# Patient Record
Sex: MALE | Race: WHITE | NOT HISPANIC OR LATINO | Employment: OTHER | URBAN - METROPOLITAN AREA
[De-identification: names, ages, dates, MRNs, and addresses within clinical notes are randomized per-mention and may not be internally consistent; named-entity substitution may affect disease eponyms.]

---

## 2018-10-15 ENCOUNTER — HOSPITAL ENCOUNTER (OUTPATIENT)
Dept: OTHER | Facility: HOSPITAL | Age: 73
Discharge: HOME | End: 2018-10-15
Attending: FAMILY MEDICINE | Admitting: FAMILY MEDICINE

## 2018-11-30 ENCOUNTER — HOSPITAL ENCOUNTER (OUTPATIENT)
Dept: OTHER | Facility: HOSPITAL | Age: 73
Discharge: HOME | End: 2018-11-30
Attending: INTERNAL MEDICINE | Admitting: INTERNAL MEDICINE

## 2018-12-04 ENCOUNTER — HOSPITAL ENCOUNTER (OUTPATIENT)
Dept: OTHER | Facility: HOSPITAL | Age: 73
Discharge: HOME | End: 2018-12-04
Attending: INTERNAL MEDICINE | Admitting: INTERNAL MEDICINE

## 2019-03-22 ENCOUNTER — HOSPITAL ENCOUNTER (OUTPATIENT)
Dept: OTHER | Facility: HOSPITAL | Age: 74
Discharge: HOME | End: 2019-03-22
Attending: SURGERY | Admitting: SURGERY

## 2019-03-29 ENCOUNTER — HOSPITAL ENCOUNTER (OUTPATIENT)
Dept: WOUND CARE | Facility: HOSPITAL | Age: 74
Discharge: HOME | End: 2019-03-29
Attending: INTERNAL MEDICINE | Admitting: INTERNAL MEDICINE

## 2019-04-05 ENCOUNTER — HOSPITAL ENCOUNTER (OUTPATIENT)
Dept: WOUND CARE | Facility: HOSPITAL | Age: 74
Discharge: HOME | End: 2019-04-05
Attending: SURGERY | Admitting: SURGERY

## 2019-04-12 ENCOUNTER — HOSPITAL ENCOUNTER (OUTPATIENT)
Dept: WOUND CARE | Facility: HOSPITAL | Age: 74
Discharge: HOME | End: 2019-04-12
Attending: SURGERY | Admitting: SURGERY

## 2019-04-19 ENCOUNTER — HOSPITAL ENCOUNTER (OUTPATIENT)
Dept: WOUND CARE | Facility: HOSPITAL | Age: 74
Discharge: HOME | End: 2019-04-19
Attending: SURGERY | Admitting: SURGERY

## 2019-04-26 ENCOUNTER — HOSPITAL ENCOUNTER (OUTPATIENT)
Dept: WOUND CARE | Facility: HOSPITAL | Age: 74
Discharge: HOME | End: 2019-04-26
Attending: SURGERY | Admitting: SURGERY

## 2019-05-02 ENCOUNTER — HOSPITAL ENCOUNTER (OUTPATIENT)
Dept: WOUND CARE | Facility: HOSPITAL | Age: 74
Discharge: HOME | End: 2019-05-02
Attending: SURGERY | Admitting: SURGERY

## 2019-05-10 ENCOUNTER — HOSPITAL ENCOUNTER (OUTPATIENT)
Dept: WOUND CARE | Facility: HOSPITAL | Age: 74
Discharge: HOME | End: 2019-05-10
Attending: SURGERY | Admitting: SURGERY

## 2019-06-27 DIAGNOSIS — I10 HYPERTENSION, UNSPECIFIED TYPE: Primary | ICD-10-CM

## 2019-06-27 NOTE — TELEPHONE ENCOUNTER
PATIENT NEEDS REFILL OF  IRBESARTAN 150MG    ONE PER DAY--CALL TO ADVISE PATIENT--[facility]

## 2019-06-28 DIAGNOSIS — Z12.5 PROSTATE CANCER SCREENING: ICD-10-CM

## 2019-06-28 DIAGNOSIS — E78.5 HYPERLIPIDEMIA, UNSPECIFIED HYPERLIPIDEMIA TYPE: ICD-10-CM

## 2019-06-28 DIAGNOSIS — I10 HYPERTENSION, UNSPECIFIED TYPE: Primary | ICD-10-CM

## 2019-06-28 RX ORDER — IRBESARTAN 150 MG/1
150 TABLET ORAL DAILY
Qty: 30 TABLET | Refills: 5 | Status: SHIPPED | OUTPATIENT
Start: 2019-06-28 | End: 2019-07-08 | Stop reason: SDUPTHER

## 2019-06-28 RX ORDER — IRBESARTAN 150 MG/1
TABLET ORAL
Refills: 5 | COMMUNITY
Start: 2019-04-26 | End: 2019-06-28 | Stop reason: SDUPTHER

## 2019-06-29 ENCOUNTER — RESULTS ENCOUNTER (OUTPATIENT)
Dept: FAMILY MEDICINE CLINIC | Facility: CLINIC | Age: 74
End: 2019-06-29

## 2019-06-29 DIAGNOSIS — I10 HYPERTENSION, UNSPECIFIED TYPE: ICD-10-CM

## 2019-06-29 DIAGNOSIS — Z12.5 PROSTATE CANCER SCREENING: ICD-10-CM

## 2019-06-29 DIAGNOSIS — E78.5 HYPERLIPIDEMIA, UNSPECIFIED HYPERLIPIDEMIA TYPE: ICD-10-CM

## 2019-07-03 ENCOUNTER — RESULTS ENCOUNTER (OUTPATIENT)
Dept: FAMILY MEDICINE CLINIC | Facility: CLINIC | Age: 74
End: 2019-07-03

## 2019-07-03 DIAGNOSIS — E78.5 HYPERLIPIDEMIA, UNSPECIFIED HYPERLIPIDEMIA TYPE: ICD-10-CM

## 2019-07-08 ENCOUNTER — OFFICE VISIT (OUTPATIENT)
Dept: FAMILY MEDICINE CLINIC | Facility: CLINIC | Age: 74
End: 2019-07-08

## 2019-07-08 VITALS
BODY MASS INDEX: 30.18 KG/M2 | TEMPERATURE: 98.4 F | RESPIRATION RATE: 19 BRPM | OXYGEN SATURATION: 98 % | DIASTOLIC BLOOD PRESSURE: 74 MMHG | SYSTOLIC BLOOD PRESSURE: 138 MMHG | HEIGHT: 70 IN | WEIGHT: 210.8 LBS | HEART RATE: 66 BPM

## 2019-07-08 DIAGNOSIS — E78.2 MIXED HYPERLIPIDEMIA: Chronic | ICD-10-CM

## 2019-07-08 DIAGNOSIS — Z12.11 COLON CANCER SCREENING: ICD-10-CM

## 2019-07-08 DIAGNOSIS — R94.39 ABNORMAL STRESS TEST: ICD-10-CM

## 2019-07-08 DIAGNOSIS — I10 HYPERTENSION, UNSPECIFIED TYPE: ICD-10-CM

## 2019-07-08 DIAGNOSIS — Z92.89 HISTORY OF USE OF HEARING AID IN BOTH EARS: ICD-10-CM

## 2019-07-08 DIAGNOSIS — L03.115 CELLULITIS OF RIGHT LOWER EXTREMITY: ICD-10-CM

## 2019-07-08 DIAGNOSIS — R79.89 ELEVATED LFTS: ICD-10-CM

## 2019-07-08 DIAGNOSIS — I25.119 CORONARY ARTERY DISEASE INVOLVING NATIVE HEART WITH ANGINA PECTORIS, UNSPECIFIED VESSEL OR LESION TYPE (HCC): ICD-10-CM

## 2019-07-08 DIAGNOSIS — S02.91XD CLOSED FRACTURE OF SKULL WITH ROUTINE HEALING, UNSPECIFIED BONE, SUBSEQUENT ENCOUNTER: ICD-10-CM

## 2019-07-08 DIAGNOSIS — L84 CALLUS OF FOOT: ICD-10-CM

## 2019-07-08 DIAGNOSIS — I10 HTN (HYPERTENSION), BENIGN: Chronic | ICD-10-CM

## 2019-07-08 DIAGNOSIS — R94.31 ABNORMAL EKG: ICD-10-CM

## 2019-07-08 DIAGNOSIS — Z00.00 MEDICARE ANNUAL WELLNESS VISIT, SUBSEQUENT: Primary | ICD-10-CM

## 2019-07-08 DIAGNOSIS — Z12.5 SCREENING FOR PROSTATE CANCER: ICD-10-CM

## 2019-07-08 DIAGNOSIS — L02.415 ABSCESS OF RIGHT LEG: ICD-10-CM

## 2019-07-08 PROBLEM — M51.9 LUMBAR DISC DISEASE: Status: ACTIVE | Noted: 2019-07-08

## 2019-07-08 PROBLEM — E66.3 OVERWEIGHT: Status: ACTIVE | Noted: 2019-07-08

## 2019-07-08 PROBLEM — Z00.01 ENCOUNTER FOR ROUTINE ADULT MEDICAL EXAM WITH ABNORMAL FINDINGS: Status: ACTIVE | Noted: 2019-07-08

## 2019-07-08 PROBLEM — I25.10 CAD (CORONARY ARTERY DISEASE): Status: ACTIVE | Noted: 2019-07-08

## 2019-07-08 LAB
BILIRUB BLD-MCNC: NEGATIVE MG/DL
CLARITY, POC: CLEAR
COLOR UR: YELLOW
GLUCOSE UR STRIP-MCNC: NEGATIVE MG/DL
KETONES UR QL: NEGATIVE
LEUKOCYTE EST, POC: NEGATIVE
NITRITE UR-MCNC: NEGATIVE MG/ML
PH UR: 6 [PH] (ref 5–8)
PROT UR STRIP-MCNC: NEGATIVE MG/DL
RBC # UR STRIP: NEGATIVE /UL
SP GR UR: 1.01 (ref 1–1.03)
UROBILINOGEN UR QL: NORMAL

## 2019-07-08 PROCEDURE — 99213 OFFICE O/P EST LOW 20 MIN: CPT | Performed by: FAMILY MEDICINE

## 2019-07-08 PROCEDURE — G0439 PPPS, SUBSEQ VISIT: HCPCS | Performed by: FAMILY MEDICINE

## 2019-07-08 PROCEDURE — 81002 URINALYSIS NONAUTO W/O SCOPE: CPT | Performed by: FAMILY MEDICINE

## 2019-07-08 PROCEDURE — 99497 ADVNCD CARE PLAN 30 MIN: CPT | Performed by: FAMILY MEDICINE

## 2019-07-08 RX ORDER — ASPIRIN 81 MG/1
1 TABLET ORAL DAILY
COMMUNITY

## 2019-07-08 RX ORDER — FLUOROURACIL 50 MG/G
CREAM TOPICAL
Refills: 1 | COMMUNITY
Start: 2019-06-17 | End: 2021-01-22

## 2019-07-08 RX ORDER — CYCLOBENZAPRINE HCL 10 MG
.5-1 TABLET ORAL NIGHTLY PRN
COMMUNITY
Start: 2018-09-04 | End: 2021-01-22

## 2019-07-08 RX ORDER — IRBESARTAN 150 MG/1
150 TABLET ORAL DAILY
Qty: 90 TABLET | Refills: 3 | Status: SHIPPED | OUTPATIENT
Start: 2019-07-08 | End: 2020-09-18 | Stop reason: SDUPTHER

## 2019-07-08 RX ORDER — NAPROXEN SODIUM 220 MG
1 TABLET ORAL DAILY
COMMUNITY
End: 2021-07-20

## 2019-07-08 RX ORDER — MULTIVITAMIN/IRON/FOLIC ACID 18MG-0.4MG
1 TABLET ORAL DAILY
COMMUNITY

## 2019-07-08 NOTE — PROGRESS NOTES
Subjective   Anthony Shelley is a 74 y.o. male.     Chief Complaint   Patient presents with   • Medicare Wellness-subsequent     last med well was 2018 last stress test 10/15/2018   • Hyperlipidemia     last lipid 2019   • Hypertension       The patient is here: to discuss health maintenance and disease prevention.  Last comprehensive physical was on 2018.  Patient's previous physician was Dr.John Johnson.  Overall has: moderate activity with work/home activities, exercises 1 time per week, good appetite, good energy level and is sleeping well. Nutrition: appropriate balanced diet. Last tetanus shot was less than 5 years ago. Patient's last colonoscopy was: 2018.    Hyperlipidemia   This is a chronic problem. The current episode started more than 1 year ago. The problem is controlled. Recent lipid tests were reviewed and are normal. There are no known factors aggravating his hyperlipidemia. Pertinent negatives include no chest pain or shortness of breath.   Hypertension   This is a chronic problem. The current episode started more than 1 year ago. Pertinent negatives include no chest pain, palpitations or shortness of breath.        Recent Hospitalizations:  Recently treated at the following:  Other: [facility].  [facility]    I personally reviewed and updated the patient's allergies, medications, problem list, and past medical, surgical, social, and family history.     Family History   Problem Relation Age of Onset   • Stroke Father    • Cancer Brother         Lymph node       Social History     Tobacco Use   • Smoking status: Former Smoker     Last attempt to quit: 2000     Years since quittin.5   Substance Use Topics   • Alcohol use: Yes   • Drug use: No       Past Surgical History:   Procedure Laterality Date   • CARDIAC CATHETERIZATION  2018   • CHOLECYSTECTOMY         Patient Active Problem List   Diagnosis   • CAD (coronary artery disease)   • HTN  (hypertension), benign   • Mixed hyperlipidemia   • Lumbar disc disease   • Overweight   • Colon cancer screening   • Medicare annual wellness visit, subsequent   • Screening for prostate cancer   • Callus of foot   • Elevated LFTs   • Cellulitis of right lower extremity         Current Outpatient Medications:   •  aspirin 81 MG EC tablet, Take 1 tablet by mouth Daily., Disp: , Rfl:   •  irbesartan (AVAPRO) 150 MG tablet, Take 1 tablet by mouth Daily., Disp: 90 tablet, Rfl: 3  •  metoprolol tartrate (LOPRESSOR) 25 MG tablet, Take 1 tablet by mouth 2 (Two) Times a Day., Disp: 180 tablet, Rfl: 3  •  Multiple Vitamins-Minerals (CENTRUM ADULTS) tablet, Take 1 tablet by mouth Daily., Disp: , Rfl:   •  naproxen sodium (ALEVE) 220 MG tablet, Take 1 tablet by mouth Daily., Disp: , Rfl:   •  cyclobenzaprine (FLEXERIL) 10 MG tablet, Take 0.5-1 tablets by mouth At Night As Needed., Disp: , Rfl:   •  fluorouracil (EFUDEX) 5 % cream, APPLY A SUFFICIENT AMOUNT TO COVER THE LESIONS IN THE AFFECTED AREA(S) OF SCALP AND FACE TWICE DAILY FOR 2 WEEKS, Disp: , Rfl: 1  •  silver sulfadiazine (SILVADENE) 1 % cream, Apply 1 application topically to the appropriate area as directed 2 (Two) Times a Day., Disp: , Rfl:          Review of Systems   Constitutional: Negative for chills and diaphoresis.   HENT: Negative for trouble swallowing and voice change.    Eyes: Negative for visual disturbance.   Respiratory: Negative for shortness of breath.    Cardiovascular: Negative for chest pain and palpitations.   Gastrointestinal: Negative for abdominal pain and nausea.   Endocrine: Negative for polydipsia and polyphagia.   Genitourinary: Negative for hematuria.   Musculoskeletal: Negative for neck stiffness.   Skin: Negative for color change and pallor.   Allergic/Immunologic: Negative for immunocompromised state.   Neurological: Negative for seizures and syncope.   Hematological: Negative for adenopathy.   Psychiatric/Behavioral: Negative for  "sleep disturbance and suicidal ideas.       Objective   /74   Pulse 66   Temp 98.4 °F (36.9 °C)   Resp 19   Ht 178.4 cm (70.25\")   Wt 95.6 kg (210 lb 12.8 oz)   SpO2 98%   BMI 30.03 kg/m²    Wt Readings from Last 3 Encounters:   07/08/19 95.6 kg (210 lb 12.8 oz)   03/20/19 95.5 kg (210 lb 9.6 oz)   02/20/19 94.2 kg (207 lb 9.6 oz)         Physical Exam   Constitutional: He is oriented to person, place, and time. He appears well-developed and well-nourished.   HENT:   Head: Normocephalic.   Right Ear: Tympanic membrane, external ear and ear canal normal.   Left Ear: Tympanic membrane, external ear and ear canal normal.   Nose: Nose normal.   Eyes: Conjunctivae, EOM and lids are normal. Pupils are equal, round, and reactive to light.   Neck: No JVD present. Carotid bruit is not present. No tracheal deviation present. No thyromegaly present.   Cardiovascular: Normal rate, regular rhythm, normal heart sounds and intact distal pulses. Exam reveals no gallop and no friction rub.   No murmur heard.  Pulmonary/Chest: Effort normal and breath sounds normal. No stridor. He has no decreased breath sounds. He has no wheezes. He has no rales.   Abdominal: Soft. Bowel sounds are normal. He exhibits no distension and no mass. There is no tenderness. There is no rebound and no guarding. No hernia.   Lymphadenopathy:        Head (right side): No submental, no submandibular, no tonsillar, no preauricular, no posterior auricular and no occipital adenopathy present.        Head (left side): No submental, no submandibular, no tonsillar, no preauricular, no posterior auricular and no occipital adenopathy present.     He has no cervical adenopathy.   Neurological: He is alert and oriented to person, place, and time. He has normal strength and normal reflexes. No cranial nerve deficit or sensory deficit. Coordination and gait normal.   Skin: Skin is warm and dry. Turgor is normal. He is not diaphoretic. No pallor.       Recent " Lab Results:          Lab Results   Component Value Date    CHOL 232 (H) 04/16/2018    TRIG 156 (H) 04/16/2018    HDL 55 04/16/2018     LDL Cholesterol    Date Value Ref Range Status   04/16/2018 148 (H) NR Final     Lab Results   Component Value Date    PSA 1.5 04/16/2018     Lab Results   Component Value Date    WBC 5.9 04/16/2018    HGB 15.7 04/16/2018    HCT 45.1 04/16/2018    MCV 91.9 04/16/2018     04/16/2018     Lab Results   Component Value Date    TSH 7.03 (H) 04/16/2018     Lab Results   Component Value Date    BUN 15 01/07/2019    CREATININE 0.90 01/07/2019    EGFRIFNONA 84 01/07/2019    EGFRIFAFRI 98 01/07/2019    BCR NOT APPLICABLE 01/07/2019    K 4.6 01/07/2019    CO2 32 01/07/2019    CALCIUM 9.6 01/07/2019    ALBUMIN 4.3 01/07/2019    AST 40 (H) 01/07/2019    ALT 62 (H) 01/07/2019         Age-appropriate Screening Schedule:  Refer to the list below for future screening recommendations based on patient's age, sex and/or medical conditions. Orders for these recommended tests are listed in the plan section. The patient has been provided with a written plan.    Health Maintenance   Topic Date Due   • TDAP/TD VACCINES (1 - Tdap) 06/28/1964   • ZOSTER VACCINE (1 of 2) 06/28/1995   • PNEUMOCOCCAL VACCINES (65+ LOW/MEDIUM RISK) (1 of 2 - PCV13) 06/28/2010   • INFLUENZA VACCINE  08/01/2019   • LIPID PANEL  07/01/2020   • COLONOSCOPY  06/28/2028       Depression Screen:   PHQ-2/PHQ-9 Depression Screening 7/8/2019   Little interest or pleasure in doing things 0   Feeling down, depressed, or hopeless 0   Trouble falling or staying asleep, or sleeping too much 0   Feeling tired or having little energy 0   Poor appetite or overeating 0   Feeling bad about yourself - or that you are a failure or have let yourself or your family down 0   Trouble concentrating on things, such as reading the newspaper or watching television 0   Moving or speaking so slowly that other people could have noticed. Or the opposite -  being so fidgety or restless that you have been moving around a lot more than usual 0   Thoughts that you would be better off dead, or of hurting yourself in some way 0   Total Score 0   If you checked off any problems, how difficult have these problems made it for you to do your work, take care of things at home, or get along with other people? Not difficult at all       Health Habits and Functional and Cognitive Screening:  Functional & Cognitive Status 7/8/2019   Do you have difficulty preparing food and eating? No   Do you have difficulty bathing yourself, getting dressed or grooming yourself? No   Do you have difficulty using the toilet? No   Do you have difficulty moving around from place to place? No   Do you have trouble with steps or getting out of a bed or a chair? No   In the past year have you fallen or experienced a near fall? No   Current Diet Well Balanced Diet   Dental Exam Up to date   Eye Exam Up to date   Exercise (times per week) 1 times per week   Current Exercise Activities Include Walking   Do you need help using the phone?  No   Are you deaf or do you have serious difficulty hearing?  Yes   Do you need help with transportation? No   Do you need help shopping? No   Do you need help preparing meals?  No   Do you need help with housework?  No   Do you need help with laundry? No   Do you need help taking your medications? No   Do you need help managing money? No   Do you ever drive or ride in a car without wearing a seat belt? No   Have you felt unusual stress, anger or loneliness in the last month? No   Who do you live with? Spouse   If you need help, do you have trouble finding someone available to you? No   Have you been bothered in the last four weeks by sexual problems? No   Do you have difficulty concentrating, remembering or making decisions? No       Does the patient have evidence of cognitive impairment? No    Advance Care Planning:  Patient does not have an advance directive - not  interested in additional information     A face-to-face visit was completed today with patient.  Counseling explanation, and discussion of advanced directives was performed.   The last advanced care visit was performed in 2018.  In a near life ending situation, from which he is not expected to recover functionally, and he is not able to speak for him, he does not want life sustaining measures. We discussed feeding tubes, ventilators and cardiac support as well as dialysis.    This discussion was completed and 16 minutes.     Identification of Risk Factors:  Risk factors include: cardiovascular risk and alcohol use.    Compared to one year ago, the patient feels his physical health is better.  Compared to one year ago, the patient feels his mental health is better.    Patient Self-Management and Personalized Health Advice  The patient has been provided with information about: weight management and preventive services including:   · Alcohol Misuse Screening and Counseling  (15 minutes counseling time, Code )  · Annual Wellness Visit (AWV)  · Colorectal Cancer Screening, Colonoscopy  · Counseling to Prevent Tobacco Use (use of smartset and [email or web address]@ smartphrase for documentation)  · Depression Screening (15 minutes face to face, Code ).      Assessment/Plan   Problems Addressed this Visit        Cardiovascular and Mediastinum    CAD (coronary artery disease)    Relevant Medications    metoprolol tartrate (LOPRESSOR) 25 MG tablet    HTN (hypertension), benign    Relevant Medications    irbesartan (AVAPRO) 150 MG tablet    metoprolol tartrate (LOPRESSOR) 25 MG tablet    Mixed hyperlipidemia       Musculoskeletal and Integument    Callus of foot    Relevant Medications    silver sulfadiazine (SILVADENE) 1 % cream    fluorouracil (EFUDEX) 5 % cream    Other Relevant Orders    Ambulatory Referral to Podiatry (Completed)       Other    Colon cancer screening    Medicare annual wellness visit, subsequent - Primary     Relevant Orders    POCT urinalysis dipstick, manual (Completed)    Screening for prostate cancer    Elevated LFTs    Cellulitis of right lower extremity     Much improved / skin closed           Other Visit Diagnoses     Abnormal stress test        History of use of hearing aid in both ears        Closed fracture of skull with routine healing, unspecified bone, subsequent encounter        Abscess of right leg        Abnormal EKG        Hypertension, unspecified type        Relevant Medications    irbesartan (AVAPRO) 150 MG tablet    metoprolol tartrate (LOPRESSOR) 25 MG tablet            Expected course, medications, and adverse effects discussed.  Call or return if worsening or persistent symptoms.

## 2019-07-13 PROBLEM — L03.115 CELLULITIS OF RIGHT LOWER EXTREMITY: Status: ACTIVE | Noted: 2019-07-13

## 2019-07-13 PROBLEM — R79.89 ELEVATED LFTS: Status: ACTIVE | Noted: 2019-07-13

## 2020-01-08 ENCOUNTER — OFFICE VISIT (OUTPATIENT)
Dept: FAMILY MEDICINE CLINIC | Facility: CLINIC | Age: 75
End: 2020-01-08

## 2020-01-08 VITALS
RESPIRATION RATE: 16 BRPM | BODY MASS INDEX: 30.61 KG/M2 | HEIGHT: 70 IN | HEART RATE: 71 BPM | WEIGHT: 213.8 LBS | TEMPERATURE: 98 F | SYSTOLIC BLOOD PRESSURE: 144 MMHG | OXYGEN SATURATION: 96 % | DIASTOLIC BLOOD PRESSURE: 84 MMHG

## 2020-01-08 DIAGNOSIS — Z23 NEED FOR INFLUENZA VACCINATION: ICD-10-CM

## 2020-01-08 DIAGNOSIS — I25.119 CORONARY ARTERY DISEASE INVOLVING NATIVE HEART WITH ANGINA PECTORIS, UNSPECIFIED VESSEL OR LESION TYPE (HCC): ICD-10-CM

## 2020-01-08 DIAGNOSIS — I10 HTN (HYPERTENSION), BENIGN: Primary | ICD-10-CM

## 2020-01-08 DIAGNOSIS — Z87.891 HISTORY OF TOBACCO USE: ICD-10-CM

## 2020-01-08 DIAGNOSIS — E66.3 OVERWEIGHT: ICD-10-CM

## 2020-01-08 DIAGNOSIS — E78.2 MIXED HYPERLIPIDEMIA: ICD-10-CM

## 2020-01-08 DIAGNOSIS — Z23 NEED FOR VACCINATION: ICD-10-CM

## 2020-01-08 PROCEDURE — 90653 IIV ADJUVANT VACCINE IM: CPT | Performed by: FAMILY MEDICINE

## 2020-01-08 PROCEDURE — 99214 OFFICE O/P EST MOD 30 MIN: CPT | Performed by: FAMILY MEDICINE

## 2020-01-08 PROCEDURE — G0008 ADMIN INFLUENZA VIRUS VAC: HCPCS | Performed by: FAMILY MEDICINE

## 2020-01-08 PROCEDURE — 90732 PPSV23 VACC 2 YRS+ SUBQ/IM: CPT | Performed by: FAMILY MEDICINE

## 2020-01-08 PROCEDURE — G0009 ADMIN PNEUMOCOCCAL VACCINE: HCPCS | Performed by: FAMILY MEDICINE

## 2020-01-08 RX ORDER — METOPROLOL SUCCINATE 50 MG/1
50 TABLET, EXTENDED RELEASE ORAL DAILY
Qty: 30 TABLET | Refills: 12 | Status: SHIPPED | OUTPATIENT
Start: 2020-01-08 | End: 2021-01-22 | Stop reason: SDUPTHER

## 2020-01-08 NOTE — PROGRESS NOTES
Subjective   Anthony Shelley is a 74 y.o. male.     Chief Complaint   Patient presents with   • Hypertension   • Hyperlipidemia       Hypertension   This is a chronic problem. The current episode started more than 1 year ago. Pertinent negatives include no chest pain, palpitations or shortness of breath. Risk factors for coronary artery disease include male gender. Current antihypertension treatment includes beta blockers (irbesartan 300mg daily). The current treatment provides mild improvement. There are no compliance problems.    Hyperlipidemia   This is a chronic problem. The current episode started more than 1 year ago. The problem is controlled. Recent lipid tests were reviewed and are normal. There are no known factors aggravating his hyperlipidemia. Pertinent negatives include no chest pain or shortness of breath. Current antihyperlipidemic treatment includes diet change. There are no compliance problems.  Risk factors for coronary artery disease include hypertension and male sex.            I personally reviewed and updated the patient's allergies, medications, problem list, and past medical, surgical, social, and family history.     Family History   Problem Relation Age of Onset   • Stroke Father    • Cancer Brother         Lymph node       Social History     Tobacco Use   • Smoking status: Former Smoker     Last attempt to quit: 2000     Years since quittin.0   • Smokeless tobacco: Never Used   Substance Use Topics   • Alcohol use: Yes   • Drug use: No       Past Surgical History:   Procedure Laterality Date   • CARDIAC CATHETERIZATION  2018   • CHOLECYSTECTOMY  1996       Patient Active Problem List   Diagnosis   • CAD (coronary artery disease)   • HTN (hypertension), benign   • Mixed hyperlipidemia   • Lumbar disc disease   • Overweight   • Colon cancer screening   • Medicare annual wellness visit, subsequent   • Screening for prostate cancer   • Callus of foot   • Elevated LFTs   •  "Cellulitis of right lower extremity   • History of tobacco use         Current Outpatient Medications:   •  aspirin 81 MG EC tablet, Take 1 tablet by mouth Daily., Disp: , Rfl:   •  cyclobenzaprine (FLEXERIL) 10 MG tablet, Take 0.5-1 tablets by mouth At Night As Needed., Disp: , Rfl:   •  fluorouracil (EFUDEX) 5 % cream, APPLY A SUFFICIENT AMOUNT TO COVER THE LESIONS IN THE AFFECTED AREA(S) OF SCALP AND FACE TWICE DAILY FOR 2 WEEKS, Disp: , Rfl: 1  •  irbesartan (AVAPRO) 150 MG tablet, Take 1 tablet by mouth Daily. (Patient taking differently: Take 300 mg by mouth Daily.), Disp: 90 tablet, Rfl: 3  •  Multiple Vitamins-Minerals (CENTRUM ADULTS) tablet, Take 1 tablet by mouth Daily., Disp: , Rfl:   •  naproxen sodium (ALEVE) 220 MG tablet, Take 1 tablet by mouth Daily., Disp: , Rfl:   •  silver sulfadiazine (SILVADENE) 1 % cream, Apply 1 application topically to the appropriate area as directed 2 (Two) Times a Day., Disp: , Rfl:   •  metoprolol succinate XL (TOPROL-XL) 50 MG 24 hr tablet, Take 1 tablet by mouth Daily for 30 days., Disp: 30 tablet, Rfl: 12         Review of Systems   Constitutional: Negative for chills and diaphoresis.   Eyes: Negative for visual disturbance.   Respiratory: Negative for shortness of breath.    Cardiovascular: Negative for chest pain and palpitations.   Gastrointestinal: Negative for abdominal pain and nausea.   Endocrine: Negative for polydipsia and polyphagia.   Musculoskeletal: Negative for neck stiffness.   Skin: Negative for color change and pallor.   Neurological: Negative for seizures and syncope.   Hematological: Negative for adenopathy.       Objective   /84 (BP Location: Left arm, Patient Position: Sitting, Cuff Size: Adult)   Pulse 71   Temp 98 °F (36.7 °C) (Oral)   Resp 16   Ht 178.4 cm (70.25\")   Wt 97 kg (213 lb 12.8 oz)   SpO2 96%   BMI 30.46 kg/m²   Wt Readings from Last 3 Encounters:   01/08/20 97 kg (213 lb 12.8 oz)   07/08/19 95.6 kg (210 lb 12.8 oz) "   03/20/19 95.5 kg (210 lb 9.6 oz)     Physical Exam   Constitutional: He is oriented to person, place, and time. He appears well-developed and well-nourished.   Cardiovascular: Normal rate, regular rhythm, S1 normal, S2 normal, normal heart sounds, intact distal pulses and normal pulses. Exam reveals no gallop and no friction rub.   No murmur heard.  Pulmonary/Chest: Effort normal and breath sounds normal. No accessory muscle usage or stridor. He has no decreased breath sounds. He has no wheezes. He has no rhonchi. He has no rales.   Abdominal: Soft. Normal appearance, normal aorta and bowel sounds are normal. He exhibits no distension, no pulsatile midline mass and no mass. There is no hepatosplenomegaly. There is no tenderness. There is no rigidity, no rebound, no guarding, no CVA tenderness and negative Thomas's sign. No hernia.   Neurological: He is alert and oriented to person, place, and time. Coordination and gait normal.   Skin: Skin is warm and dry. Turgor is normal. He is not diaphoretic. No pallor.         Assessment/Plan       Hypertension.  Persistent elevation, metoprolol increased.  Followed by Dr. Odom, cardiology follow-up upcoming.  Follow-up here 6 months.  Call or return if worsening symptoms.  Hyperlipidemia.  Recheck fasting lipid panel off statin currently, consider start low-dose crestor considering history of coronary artery disease.  Elevated LFTs resolved.  Coronary artery disease.  History of complete obstruction LAD with collaterals.  Continue risk factor reduction.  Followed by cardiology Dr. Odom.  Cellulitis right lower extremity.  Complicating hematoma.  Improved/resolved, wound healed, status post course wound care.    Problem List Items Addressed This Visit        Cardiovascular and Mediastinum    CAD (coronary artery disease)    Relevant Medications    metoprolol succinate XL (TOPROL-XL) 50 MG 24 hr tablet    HTN (hypertension), benign - Primary    Relevant Medications     metoprolol succinate XL (TOPROL-XL) 50 MG 24 hr tablet    Mixed hyperlipidemia    Relevant Orders    Comprehensive Metabolic Panel    Lipid Panel With / Chol / HDL Ratio       Other    Overweight    History of tobacco use      Other Visit Diagnoses     Need for influenza vaccination        Relevant Orders    Fluad Tri 65yr+ (Completed)    Need for vaccination        Relevant Orders    Pneumococcal Polysaccharide Vaccine 23-Valent Greater Than or Equal To 1yo Subcutaneous / IM (Completed)              Expected course, medications, and adverse effects discussed.  Call or return if worsening or persistent symptoms.

## 2020-01-09 LAB
ALBUMIN SERPL-MCNC: 4.6 G/DL (ref 3.5–4.8)
ALBUMIN/GLOB SERPL: 1.9 {RATIO} (ref 1.2–2.2)
ALP SERPL-CCNC: 103 IU/L (ref 39–117)
ALT SERPL-CCNC: 29 IU/L (ref 0–44)
AST SERPL-CCNC: 25 IU/L (ref 0–40)
BILIRUB SERPL-MCNC: 1.2 MG/DL (ref 0–1.2)
BUN SERPL-MCNC: 8 MG/DL (ref 8–27)
BUN/CREAT SERPL: 8 (ref 10–24)
CALCIUM SERPL-MCNC: 9.3 MG/DL (ref 8.6–10.2)
CHLORIDE SERPL-SCNC: 99 MMOL/L (ref 96–106)
CHOLEST SERPL-MCNC: 207 MG/DL (ref 100–199)
CHOLEST/HDLC SERPL: 3.6 RATIO (ref 0–5)
CO2 SERPL-SCNC: 24 MMOL/L (ref 20–29)
CREAT SERPL-MCNC: 0.98 MG/DL (ref 0.76–1.27)
GLOBULIN SER CALC-MCNC: 2.4 G/DL (ref 1.5–4.5)
GLUCOSE SERPL-MCNC: 105 MG/DL (ref 65–99)
HDLC SERPL-MCNC: 58 MG/DL
LDLC SERPL CALC-MCNC: 118 MG/DL (ref 0–99)
POTASSIUM SERPL-SCNC: 4.6 MMOL/L (ref 3.5–5.2)
PROT SERPL-MCNC: 7 G/DL (ref 6–8.5)
SODIUM SERPL-SCNC: 138 MMOL/L (ref 134–144)
TRIGL SERPL-MCNC: 156 MG/DL (ref 0–149)
VLDLC SERPL CALC-MCNC: 31 MG/DL (ref 5–40)

## 2020-01-10 DIAGNOSIS — E78.2 MIXED HYPERLIPIDEMIA: Primary | ICD-10-CM

## 2020-01-10 RX ORDER — ROSUVASTATIN CALCIUM 5 MG/1
5 TABLET, COATED ORAL DAILY
Qty: 30 TABLET | Refills: 12 | Status: SHIPPED | OUTPATIENT
Start: 2020-01-10 | End: 2020-12-14 | Stop reason: SDUPTHER

## 2020-01-10 NOTE — TELEPHONE ENCOUNTER
----- Message from Jonathan Johnson MD sent at 1/10/2020  6:29 AM EST -----  Let him know his blood work overall looks good, his liver function test remains normal, his cholesterol is just mildly elevated his LDL is 118, target is less than 100, it is overall better, last time it was 148, want him to keep up the good work on diet and exercise, I do want to try another cholesterol medicine to help decrease his risk of having more blockages in his heart, this one is Crestor, it is a low dose and is less likely to elevate his liver function tests, if he is okay with that send a request for 5 mg daily, thanks

## 2020-02-20 ENCOUNTER — HOSPITAL ENCOUNTER (OUTPATIENT)
Dept: CARDIOLOGY | Facility: HOSPITAL | Age: 75
Discharge: HOME | End: 2020-02-20
Admitting: INTERNAL MEDICINE

## 2020-02-20 VITALS — WEIGHT: 213 LBS | HEIGHT: 70 IN | BODY MASS INDEX: 30.49 KG/M2

## 2020-02-20 DIAGNOSIS — I51.7 CARDIOMEGALY: ICD-10-CM

## 2020-02-20 LAB
BH CV ECHO MEAS - ACS: 1.8 CM
BH CV ECHO MEAS - AO MAX PG (FULL): 0.95 MMHG
BH CV ECHO MEAS - AO MAX PG: 4.6 MMHG
BH CV ECHO MEAS - AO MEAN PG (FULL): 0.12 MMHG
BH CV ECHO MEAS - AO MEAN PG: 2.2 MMHG
BH CV ECHO MEAS - AO ROOT AREA (BSA CORRECTED): 1.4
BH CV ECHO MEAS - AO ROOT AREA: 6.9 CM^2
BH CV ECHO MEAS - AO ROOT DIAM: 3 CM
BH CV ECHO MEAS - AO V2 MAX: 106.9 CM/SEC
BH CV ECHO MEAS - AO V2 MEAN: 69.2 CM/SEC
BH CV ECHO MEAS - AO V2 VTI: 21.1 CM
BH CV ECHO MEAS - AVA(I,A): 3.5 CM^2
BH CV ECHO MEAS - AVA(I,D): 3.5 CM^2
BH CV ECHO MEAS - AVA(V,A): 3.1 CM^2
BH CV ECHO MEAS - AVA(V,D): 3.1 CM^2
BH CV ECHO MEAS - BSA(HAYCOCK): 2.2 M^2
BH CV ECHO MEAS - BSA: 2.1 M^2
BH CV ECHO MEAS - BZI_BMI: 30.6 KILOGRAMS/M^2
BH CV ECHO MEAS - BZI_METRIC_HEIGHT: 177.8 CM
BH CV ECHO MEAS - BZI_METRIC_WEIGHT: 96.6 KG
BH CV ECHO MEAS - EDV(CUBED): 142.7 ML
BH CV ECHO MEAS - EDV(MOD-SP4): 89.1 ML
BH CV ECHO MEAS - EDV(TEICH): 131 ML
BH CV ECHO MEAS - EF(CUBED): 54.8 %
BH CV ECHO MEAS - EF(MOD-BP): 45 %
BH CV ECHO MEAS - EF(MOD-SP4): 43.7 %
BH CV ECHO MEAS - EF(TEICH): 46.3 %
BH CV ECHO MEAS - ESV(CUBED): 64.5 ML
BH CV ECHO MEAS - ESV(MOD-SP4): 50.2 ML
BH CV ECHO MEAS - ESV(TEICH): 70.4 ML
BH CV ECHO MEAS - FS: 23.3 %
BH CV ECHO MEAS - IVS/LVPW: 1
BH CV ECHO MEAS - IVSD: 1.1 CM
BH CV ECHO MEAS - LA DIMENSION: 4.1 CM
BH CV ECHO MEAS - LA/AO: 1.4
BH CV ECHO MEAS - LV DIASTOLIC VOL/BSA (35-75): 41.6 ML/M^2
BH CV ECHO MEAS - LV MASS(C)D: 222.1 GRAMS
BH CV ECHO MEAS - LV MASS(C)DI: 103.6 GRAMS/M^2
BH CV ECHO MEAS - LV MAX PG: 3.6 MMHG
BH CV ECHO MEAS - LV MEAN PG: 2.1 MMHG
BH CV ECHO MEAS - LV SYSTOLIC VOL/BSA (12-30): 23.4 ML/M^2
BH CV ECHO MEAS - LV V1 MAX: 95.1 CM/SEC
BH CV ECHO MEAS - LV V1 MEAN: 68.1 CM/SEC
BH CV ECHO MEAS - LV V1 VTI: 21.8 CM
BH CV ECHO MEAS - LVIDD: 5.2 CM
BH CV ECHO MEAS - LVIDS: 4 CM
BH CV ECHO MEAS - LVOT AREA: 3.4 CM^2
BH CV ECHO MEAS - LVOT DIAM: 2.1 CM
BH CV ECHO MEAS - LVPWD: 1.1 CM
BH CV ECHO MEAS - MR MAX PG: 85.9 MMHG
BH CV ECHO MEAS - MR MAX VEL: 463.3 CM/SEC
BH CV ECHO MEAS - MV A MAX VEL: 78.2 CM/SEC
BH CV ECHO MEAS - MV E MAX VEL: 65.8 CM/SEC
BH CV ECHO MEAS - MV E/A: 0.84
BH CV ECHO MEAS - MV MAX PG: 2.7 MMHG
BH CV ECHO MEAS - MV MEAN PG: 1.1 MMHG
BH CV ECHO MEAS - MV V2 MAX: 82.8 CM/SEC
BH CV ECHO MEAS - MV V2 MEAN: 47.9 CM/SEC
BH CV ECHO MEAS - MV V2 VTI: 24.5 CM
BH CV ECHO MEAS - MVA(VTI): 3.1 CM^2
BH CV ECHO MEAS - PA ACC TIME: 0.13 SEC
BH CV ECHO MEAS - PA MAX PG: 14.6 MMHG
BH CV ECHO MEAS - PA PR(ACCEL): 19.9 MMHG
BH CV ECHO MEAS - PA V2 MAX: 191 CM/SEC
BH CV ECHO MEAS - RAP SYSTOLE: 10 MMHG
BH CV ECHO MEAS - RVDD(2D): 1.9 CM
BH CV ECHO MEAS - RVDD: 1.9 CM
BH CV ECHO MEAS - RVSP: 37.3 MMHG
BH CV ECHO MEAS - SI(AO): 68.3 ML/M^2
BH CV ECHO MEAS - SI(CUBED): 36.5 ML/M^2
BH CV ECHO MEAS - SI(LVOT): 35 ML/M^2
BH CV ECHO MEAS - SI(MOD-SP4): 18.2 ML/M^2
BH CV ECHO MEAS - SI(TEICH): 28.3 ML/M^2
BH CV ECHO MEAS - SV(AO): 146.4 ML
BH CV ECHO MEAS - SV(CUBED): 78.2 ML
BH CV ECHO MEAS - SV(LVOT): 74.9 ML
BH CV ECHO MEAS - SV(MOD-SP4): 38.9 ML
BH CV ECHO MEAS - SV(TEICH): 60.6 ML
BH CV ECHO MEAS - TR MAX VEL: 245.7 CM/SEC
LV EF 2D ECHO EST: 45 %
MAXIMAL PREDICTED HEART RATE: 146 BPM
STRESS TARGET HR: 124 BPM

## 2020-02-20 PROCEDURE — 93306 TTE W/DOPPLER COMPLETE: CPT

## 2020-02-20 PROCEDURE — 93306 TTE W/DOPPLER COMPLETE: CPT | Performed by: INTERNAL MEDICINE

## 2020-03-03 ENCOUNTER — TRANSCRIBE ORDERS (OUTPATIENT)
Dept: ADMINISTRATIVE | Facility: HOSPITAL | Age: 75
End: 2020-03-03

## 2020-03-03 DIAGNOSIS — I25.10 CHRONIC CORONARY ARTERY DISEASE: Primary | ICD-10-CM

## 2020-03-12 ENCOUNTER — HOSPITAL ENCOUNTER (OUTPATIENT)
Dept: NUCLEAR MEDICINE | Facility: HOSPITAL | Age: 75
Discharge: HOME | End: 2020-03-12

## 2020-03-12 DIAGNOSIS — I25.10 CHRONIC CORONARY ARTERY DISEASE: ICD-10-CM

## 2020-03-12 LAB
BH CV NUCLEAR PRIOR STUDY: 3
BH CV STRESS BP STAGE 1: NORMAL
BH CV STRESS BP STAGE 2: NORMAL
BH CV STRESS DURATION MIN STAGE 1: 3
BH CV STRESS DURATION MIN STAGE 2: 3
BH CV STRESS DURATION SEC STAGE 1: 0
BH CV STRESS DURATION SEC STAGE 2: 0
BH CV STRESS GRADE STAGE 1: 10
BH CV STRESS GRADE STAGE 2: 12
BH CV STRESS HR STAGE 1: 121
BH CV STRESS HR STAGE 2: 128
BH CV STRESS METS STAGE 1: 5
BH CV STRESS METS STAGE 2: 7.5
BH CV STRESS PROTOCOL 1: NORMAL
BH CV STRESS RECOVERY BP: NORMAL MMHG
BH CV STRESS RECOVERY HR: 74 BPM
BH CV STRESS SPEED STAGE 1: 1.7
BH CV STRESS SPEED STAGE 2: 2.5
BH CV STRESS STAGE 1: 1
BH CV STRESS STAGE 2: 2
LV EF NUC BP: 56 %
MAXIMAL PREDICTED HEART RATE: 146 BPM
PERCENT MAX PREDICTED HR: 87.67 %
STRESS BASELINE BP: NORMAL MMHG
STRESS BASELINE HR: 65 BPM
STRESS PERCENT HR: 103 %
STRESS POST EXERCISE DUR MIN: 4 MIN
STRESS POST EXERCISE DUR SEC: 40 SEC
STRESS POST PEAK BP: NORMAL MMHG
STRESS POST PEAK HR: 128 BPM
STRESS TARGET HR: 124 BPM

## 2020-03-12 PROCEDURE — 0 TECHNETIUM SESTAMIBI: Performed by: INTERNAL MEDICINE

## 2020-03-12 PROCEDURE — 93018 CV STRESS TEST I&R ONLY: CPT | Performed by: INTERNAL MEDICINE

## 2020-03-12 PROCEDURE — A9500 TC99M SESTAMIBI: HCPCS | Performed by: INTERNAL MEDICINE

## 2020-03-12 PROCEDURE — 78452 HT MUSCLE IMAGE SPECT MULT: CPT

## 2020-03-12 PROCEDURE — 78452 HT MUSCLE IMAGE SPECT MULT: CPT | Performed by: INTERNAL MEDICINE

## 2020-03-12 PROCEDURE — 93016 CV STRESS TEST SUPVJ ONLY: CPT | Performed by: NURSE PRACTITIONER

## 2020-03-12 PROCEDURE — 93017 CV STRESS TEST TRACING ONLY: CPT

## 2020-03-12 RX ADMIN — TECHNETIUM TC 99M SESTAMIBI 1 DOSE: 1 INJECTION INTRAVENOUS at 09:00

## 2020-03-12 RX ADMIN — TECHNETIUM TC 99M SESTAMIBI 1 DOSE: 1 INJECTION INTRAVENOUS at 10:10

## 2020-07-17 ENCOUNTER — OFFICE VISIT (OUTPATIENT)
Dept: FAMILY MEDICINE CLINIC | Facility: CLINIC | Age: 75
End: 2020-07-17

## 2020-07-17 VITALS
HEIGHT: 70 IN | HEART RATE: 80 BPM | RESPIRATION RATE: 18 BRPM | DIASTOLIC BLOOD PRESSURE: 73 MMHG | WEIGHT: 214 LBS | TEMPERATURE: 98.2 F | BODY MASS INDEX: 30.64 KG/M2 | SYSTOLIC BLOOD PRESSURE: 124 MMHG | OXYGEN SATURATION: 99 %

## 2020-07-17 DIAGNOSIS — Z12.5 SCREENING FOR PROSTATE CANCER: ICD-10-CM

## 2020-07-17 DIAGNOSIS — E66.3 OVERWEIGHT: ICD-10-CM

## 2020-07-17 DIAGNOSIS — Z12.11 COLON CANCER SCREENING: ICD-10-CM

## 2020-07-17 DIAGNOSIS — R79.89 ELEVATED LFTS: ICD-10-CM

## 2020-07-17 DIAGNOSIS — Z87.891 HISTORY OF TOBACCO USE: ICD-10-CM

## 2020-07-17 DIAGNOSIS — I10 HTN (HYPERTENSION), BENIGN: ICD-10-CM

## 2020-07-17 DIAGNOSIS — Z00.00 MEDICARE ANNUAL WELLNESS VISIT, SUBSEQUENT: Primary | ICD-10-CM

## 2020-07-17 DIAGNOSIS — E78.2 MIXED HYPERLIPIDEMIA: ICD-10-CM

## 2020-07-17 DIAGNOSIS — R79.89 ELEVATED LIVER FUNCTION TESTS: ICD-10-CM

## 2020-07-17 LAB
BILIRUB BLD-MCNC: NEGATIVE MG/DL
CLARITY, POC: CLEAR
COLOR UR: YELLOW
GLUCOSE UR STRIP-MCNC: NEGATIVE MG/DL
KETONES UR QL: NEGATIVE
LEUKOCYTE EST, POC: NEGATIVE
NITRITE UR-MCNC: NEGATIVE MG/ML
PH UR: 5 [PH] (ref 5–8)
PROT UR STRIP-MCNC: ABNORMAL MG/DL
RBC # UR STRIP: NEGATIVE /UL
SP GR UR: 1.02 (ref 1–1.03)
UROBILINOGEN UR QL: NORMAL

## 2020-07-17 PROCEDURE — 99214 OFFICE O/P EST MOD 30 MIN: CPT | Performed by: FAMILY MEDICINE

## 2020-07-17 PROCEDURE — 81003 URINALYSIS AUTO W/O SCOPE: CPT | Performed by: FAMILY MEDICINE

## 2020-07-17 PROCEDURE — G0439 PPPS, SUBSEQ VISIT: HCPCS | Performed by: FAMILY MEDICINE

## 2020-07-17 PROCEDURE — 99497 ADVNCD CARE PLAN 30 MIN: CPT | Performed by: FAMILY MEDICINE

## 2020-07-17 NOTE — PROGRESS NOTES
Subjective   Anthony Shelley is a 75 y.o. male.     Chief Complaint   Patient presents with   • Medicare Wellness-subsequent   • Hypertension   • Hyperlipidemia       The patient is here: to discuss health maintenance and disease prevention to follow up on multiple medical problems.  Last comprehensive physical was on 7/8/2019.  Previous physical was performed by  Jonathan Johnson MD  Overall has: moderate activity with work/home activities, exercises 2 - 3 times per week, good appetite, feels well with no complaints, good energy level and is sleeping well. Nutrition: appropriate balanced diet, balanced diet and eating a variety of foods. Last tetanus shot was 7/10/2017. Patient's last colonoscopy was: 6/28/2018. Patients last PSA was 4/16/2018 and was 1.5. Patients last stress test was 3/3/2020. Patients last carotid was 10/15/2018. Patients last AAA was 9/26/2013.    Hypertension   This is a chronic problem. The current episode started more than 1 year ago. Pertinent negatives include no chest pain, palpitations or shortness of breath. Risk factors for coronary artery disease include male gender. Current antihypertension treatment includes beta blockers (irbesartan 300mg daily). The current treatment provides mild improvement. There are no compliance problems.    Hyperlipidemia   This is a chronic problem. The current episode started more than 1 year ago. The problem is controlled. Recent lipid tests were reviewed and are normal. There are no known factors aggravating his hyperlipidemia. Pertinent negatives include no chest pain or shortness of breath. Current antihyperlipidemic treatment includes diet change. There are no compliance problems.  Risk factors for coronary artery disease include hypertension and male sex.        Recent Hospitalizations:  No hospitalization(s) within the last year..  ccc    I personally reviewed and updated the patient's allergies, medications, problem list, and past medical,  surgical, social, and family history.     Family History   Problem Relation Age of Onset   • Stroke Father    • Cancer Brother         Lymph node       Social History     Tobacco Use   • Smoking status: Former Smoker     Last attempt to quit: 2000     Years since quittin.5   • Smokeless tobacco: Never Used   Substance Use Topics   • Alcohol use: Yes   • Drug use: No       Past Surgical History:   Procedure Laterality Date   • CARDIAC CATHETERIZATION  2018   • CHOLECYSTECTOMY         Patient Active Problem List   Diagnosis   • CAD (coronary artery disease)   • HTN (hypertension), benign   • Mixed hyperlipidemia   • Lumbar disc disease   • Overweight   • Colon cancer screening   • Medicare annual wellness visit, subsequent   • Screening for prostate cancer   • Callus of foot   • Elevated LFTs   • Cellulitis of right lower extremity   • History of tobacco use         Current Outpatient Medications:   •  amLODIPine (Norvasc) 5 MG tablet, Norvasc 5 mg tablet  Take 1 tablet every day by oral route at bedtime for 90 days., Disp: , Rfl:   •  aspirin 81 MG EC tablet, Take 1 tablet by mouth Daily., Disp: , Rfl:   •  cyclobenzaprine (FLEXERIL) 10 MG tablet, Take 0.5-1 tablets by mouth At Night As Needed., Disp: , Rfl:   •  fluorouracil (EFUDEX) 5 % cream, APPLY A SUFFICIENT AMOUNT TO COVER THE LESIONS IN THE AFFECTED AREA(S) OF SCALP AND FACE TWICE DAILY FOR 2 WEEKS, Disp: , Rfl: 1  •  irbesartan (AVAPRO) 150 MG tablet, Take 1 tablet by mouth Daily. (Patient taking differently: Take 300 mg by mouth Daily.), Disp: 90 tablet, Rfl: 3  •  metoprolol succinate XL (TOPROL-XL) 50 MG 24 hr tablet, metoprolol succinate ER 50 mg tablet,extended release 24 hr, Disp: , Rfl:   •  Multiple Vitamins-Minerals (CENTRUM ADULTS) tablet, Take 1 tablet by mouth Daily., Disp: , Rfl:   •  naproxen sodium (ALEVE) 220 MG tablet, Take 1 tablet by mouth Daily., Disp: , Rfl:   •  rosuvastatin (CRESTOR) 5 MG tablet, Take 1 tablet by  "mouth Daily., Disp: 30 tablet, Rfl: 12  •  silver sulfadiazine (SILVADENE) 1 % cream, Apply 1 application topically to the appropriate area as directed 2 (Two) Times a Day., Disp: , Rfl:   •  traMADol (ULTRAM) 50 MG tablet, tramadol 50 mg tablet, Disp: , Rfl:          Review of Systems   Constitutional: Negative for chills and diaphoresis.   HENT: Negative for trouble swallowing and voice change.    Eyes: Negative for visual disturbance.   Respiratory: Negative for shortness of breath.    Cardiovascular: Negative for chest pain and palpitations.   Gastrointestinal: Negative for abdominal pain and nausea.   Endocrine: Negative for polydipsia and polyphagia.   Genitourinary: Negative for hematuria.   Musculoskeletal: Negative for neck stiffness.   Skin: Negative for color change and pallor.   Allergic/Immunologic: Negative for immunocompromised state.   Neurological: Negative for seizures and syncope.   Hematological: Negative for adenopathy.   Psychiatric/Behavioral: Negative for sleep disturbance and suicidal ideas.       I have reviewed and confirmed the accuracy of the ROS as documented by the MA/LPN/RN Jonathan Johnson MD      Objective   /73 (BP Location: Right arm, Patient Position: Sitting, Cuff Size: Adult)   Pulse 80   Temp 98.2 °F (36.8 °C)   Resp 18   Ht 178.4 cm (70.24\")   Wt 97.1 kg (214 lb)   SpO2 99%   BMI 30.50 kg/m²   BP Readings from Last 3 Encounters:   07/17/20 124/73   01/08/20 144/84   07/08/19 138/74     Wt Readings from Last 3 Encounters:   07/17/20 97.1 kg (214 lb)   02/20/20 96.6 kg (213 lb)   01/08/20 97 kg (213 lb 12.8 oz)     Physical Exam   Constitutional: He is oriented to person, place, and time. He appears well-developed and well-nourished.   HENT:   Head: Normocephalic.   Right Ear: Tympanic membrane, external ear and ear canal normal.   Left Ear: Tympanic membrane, external ear and ear canal normal.   Nose: Nose normal.   Eyes: Pupils are equal, round, and reactive to " light. Conjunctivae, EOM and lids are normal.   Neck: No JVD present. Carotid bruit is not present. No tracheal deviation present. No thyromegaly present.   Cardiovascular: Normal rate, regular rhythm, normal heart sounds and intact distal pulses. Exam reveals no gallop and no friction rub.   No murmur heard.  Pulmonary/Chest: Effort normal and breath sounds normal. No stridor. He has no decreased breath sounds. He has no wheezes. He has no rales.   Abdominal: Soft. Bowel sounds are normal. He exhibits no distension and no mass. There is no tenderness. There is no rebound and no guarding. No hernia.   Lymphadenopathy:        Head (right side): No submental, no submandibular, no tonsillar, no preauricular, no posterior auricular and no occipital adenopathy present.        Head (left side): No submental, no submandibular, no tonsillar, no preauricular, no posterior auricular and no occipital adenopathy present.     He has no cervical adenopathy.   Neurological: He is alert and oriented to person, place, and time. He has normal strength and normal reflexes. No cranial nerve deficit or sensory deficit. Coordination and gait normal.   Skin: Skin is warm and dry. Turgor is normal. He is not diaphoretic. No pallor.       Recent Lab Results:    Lab Results   Component Value Date    GLU 96 07/07/2020        Lab Results   Component Value Date    CHOL 232 (H) 04/16/2018    CHLPL 155 07/07/2020    CHLPL 207 (H) 01/08/2020     Lab Results   Component Value Date    TRIG 104 07/07/2020    TRIG 156 (H) 01/08/2020    TRIG 156 (H) 04/16/2018     Lab Results   Component Value Date    HDL 67 07/07/2020    HDL 58 01/08/2020    HDL 55 04/16/2018     Lab Results   Component Value Date    LDL 67 07/07/2020     (H) 01/08/2020     (H) 04/16/2018     Lab Results   Component Value Date    PSA 1.5 04/16/2018     Lab Results   Component Value Date    WBC 6.1 07/07/2020    HGB 14.5 07/07/2020    HCT 42.5 07/07/2020    MCV 96  07/07/2020     07/07/2020     Lab Results   Component Value Date    TSH 6.440 (H) 07/07/2020     Lab Results   Component Value Date    BUN 11 07/07/2020    CREATININE 0.97 07/07/2020    EGFRIFNONA 76 07/07/2020    EGFRIFAFRI 88 07/07/2020    BCR 11 07/07/2020    K 4.8 07/07/2020    CO2 26 07/07/2020    CALCIUM 9.3 07/07/2020    PROTENTOTREF 6.7 07/07/2020    ALBUMIN 4.4 07/07/2020    LABIL2 1.9 07/07/2020    AST 42 (H) 07/07/2020    ALT 53 (H) 07/07/2020         Age-appropriate Screening Schedule:  Refer to the list below for future screening recommendations based on patient's age, sex and/or medical conditions. Orders for these recommended tests are listed in the plan section. The patient has been provided with a written plan.    Health Maintenance   Topic Date Due   • TDAP/TD VACCINES (1 - Tdap) 06/28/1956   • ZOSTER VACCINE (1 of 2) 06/28/1995   • INFLUENZA VACCINE  08/01/2020   • LIPID PANEL  07/07/2021   • COLONOSCOPY  06/28/2028       Depression Screen:   PHQ-2/PHQ-9 Depression Screening 7/17/2020   Little interest or pleasure in doing things 0   Feeling down, depressed, or hopeless 0   Trouble falling or staying asleep, or sleeping too much 0   Feeling tired or having little energy 0   Poor appetite or overeating 0   Feeling bad about yourself - or that you are a failure or have let yourself or your family down 0   Trouble concentrating on things, such as reading the newspaper or watching television 0   Moving or speaking so slowly that other people could have noticed. Or the opposite - being so fidgety or restless that you have been moving around a lot more than usual 0   Thoughts that you would be better off dead, or of hurting yourself in some way 0   Total Score 0   If you checked off any problems, how difficult have these problems made it for you to do your work, take care of things at home, or get along with other people? Not difficult at all     I spent more than 16 minutes asking patient  questions, counseling and documenting in the chart.    Health Habits and Functional and Cognitive Screening:  Functional & Cognitive Status 7/17/2020   Do you have difficulty preparing food and eating? No   Do you have difficulty bathing yourself, getting dressed or grooming yourself? No   Do you have difficulty using the toilet? No   Do you have difficulty moving around from place to place? No   Do you have trouble with steps or getting out of a bed or a chair? No   Current Diet Well Balanced Diet   Dental Exam Up to date   Eye Exam Up to date   Exercise (times per week) 3 times per week   Current Exercise Activities Include Yard Work   Do you need help using the phone?  No   Are you deaf or do you have serious difficulty hearing?  No   Do you need help with transportation? No   Do you need help shopping? No   Do you need help preparing meals?  No   Do you need help with housework?  No   Do you need help with laundry? No   Do you need help taking your medications? No   Do you need help managing money? No   Do you ever drive or ride in a car without wearing a seat belt? No   Have you felt unusual stress, anger or loneliness in the last month? No   Who do you live with? Spouse   If you need help, do you have trouble finding someone available to you? No   Have you been bothered in the last four weeks by sexual problems? No   Do you have difficulty concentrating, remembering or making decisions? No       Does the patient have evidence of cognitive impairment? No    Advance Care Planning:  ACP discussion was held with the patient during this visit. Patient does not have an advance directive, information provided.     A face-to-face visit was completed today with patient.  Counseling explanation, and discussion of advanced directives was performed.   The last advanced care visit was performed in 2019.  In a near life ending situation, from which he is not expected to recover functionally, and he is not able to speak for  him, he does not want life sustaining measures. We discussed feeding tubes, ventilators and cardiac support as well as dialysis.    I spent more than 16 minutes discussing Advanced Care Planning information and documenting in the chart.    Identification of Risk Factors:  Risk factors include: Abdominal Aortic Aneurysm Screening  Advance Directive Discussion  Cardiovascular risk  Fall Risk  Immunizations Discussed/Encouraged (specific immunizations; Prevnar )  Lung Cancer Risk  Obesity/Overweight .    Compared to one year ago, the patient feels his physical health is better.  Compared to one year ago, the patient feels his mental health is better.    Patient Self-Management and Personalized Health Advice  The patient has been provided with information about: diet, exercise, weight management, prevention of cardiac or vascular disease, the relationship between weight and GERD, fall prevention, designing advance directives, supplements and mental health concerns and preventive services including:   · Annual Wellness Visit (AWV)  · Cardiovascular Disease Screening Tests (may do this order every 5 years in beneficiaries without signs or symptoms of cardiovascular disease)  · Depression Screening (15 minutes face to face, Code )  · Influenza Vaccine and Administration  · Pneumococcal Vaccine and Administration.      Assessment/Plan      Medications        Problem List         LOS    Medicare wellness.  Doing well, vaccines current.  Coated baby aspirin daily.  Discussed health maintenance, screening test, lifestyle modification.  Followed by dermatology for yearly skin exams.  Hypertension.    Improved today.  Discussed low-sodium diet.  Follow-up recheck  Hyperlipidemia.    Much improved today back on rosuvastatin 5 mg daily.  Discussed diet, exercise, lifestyle modification.  Aggressive LDL target considering CAD.  Positive mild elevation LFTs, benefit statin outweighs risk.  Elevated LFTs.  Mild.  Benefit statin  outweighs risk.  Check blood work/right upper quadrant ultrasound.  Follow-up recheck.  Coronary artery disease.  History of complete obstruction LAD with collaterals.  Continue risk factor reduction.  Followed by cardiology Dr. Odom, has had repeat stress testing March/2020..  Cellulitis right lower extremity.  Complicating hematoma.  Improved/resolved, wound healed, status post course wound care.  Neck pain.  Likely secondary to OA.  Ice, rehabilitation exercises discussed.  DDX includes cervical disc disease, consider imaging if persistent symptoms.  Carotid stenosis.  Mild per ultrasound 2018.  Plan to repeat in 3 years.  Screening for colon cancer.  Colonoscopy benign 2018.         Problem List Items Addressed This Visit        Unprioritized    HTN (hypertension), benign    Mixed hyperlipidemia    Overweight    Colon cancer screening    Overview     Remote history of colonoscopy per his report, repeat colonoscopy scheduled         Medicare annual wellness visit, subsequent - Primary    Relevant Orders    POC Urinalysis Dipstick, Multipro (Completed)    Screening for prostate cancer    Elevated LFTs    History of tobacco use      Other Visit Diagnoses     Elevated liver function tests        Relevant Orders    US Gallbladder              Expected course, medications, and adverse effects discussed.  Call or return if worsening or persistent symptoms.

## 2020-07-29 ENCOUNTER — RESULTS ENCOUNTER (OUTPATIENT)
Dept: FAMILY MEDICINE CLINIC | Facility: CLINIC | Age: 75
End: 2020-07-29

## 2020-07-29 DIAGNOSIS — Z11.59 NEED FOR HEPATITIS B SCREENING TEST: ICD-10-CM

## 2020-08-25 ENCOUNTER — TELEPHONE (OUTPATIENT)
Dept: FAMILY MEDICINE CLINIC | Facility: CLINIC | Age: 75
End: 2020-08-25

## 2020-08-25 NOTE — TELEPHONE ENCOUNTER
Patient took his wife Tirso Shelley to [facility] yesterday and she tested Positive for covid and has pneumonia. Anthony would like to know what he needs to do since they live in the same house hold.

## 2020-08-26 NOTE — TELEPHONE ENCOUNTER
Check on him, want him to rest and drink plenty of fluids, keep isolated from his wife as much as possible.  If he develops symptoms should call and let us know right away, thanks

## 2020-09-14 ENCOUNTER — TELEPHONE (OUTPATIENT)
Dept: FAMILY MEDICINE CLINIC | Facility: CLINIC | Age: 75
End: 2020-09-14

## 2020-09-14 DIAGNOSIS — U07.1 COVID-19: Primary | ICD-10-CM

## 2020-09-14 RX ORDER — AZITHROMYCIN 250 MG/1
TABLET, FILM COATED ORAL
Qty: 6 TABLET | Refills: 0 | Status: SHIPPED | OUTPATIENT
Start: 2020-09-14 | End: 2021-01-22

## 2020-09-14 NOTE — TELEPHONE ENCOUNTER
Send over a Z-Sherman for him, make sure he is taking a daily coated baby aspirin daily, get him set up for a phone visit in the next day or 2, thanks

## 2020-09-14 NOTE — TELEPHONE ENCOUNTER
Patient called to let us know he tested positive for COVID-19 on 9/11/20 at the [facility].

## 2020-09-18 ENCOUNTER — OFFICE VISIT (OUTPATIENT)
Dept: FAMILY MEDICINE CLINIC | Facility: CLINIC | Age: 75
End: 2020-09-18

## 2020-09-18 DIAGNOSIS — Z87.891 HISTORY OF TOBACCO USE: ICD-10-CM

## 2020-09-18 DIAGNOSIS — U07.1 COVID-19: Primary | ICD-10-CM

## 2020-09-18 DIAGNOSIS — I25.119 CORONARY ARTERY DISEASE INVOLVING NATIVE HEART WITH ANGINA PECTORIS, UNSPECIFIED VESSEL OR LESION TYPE (HCC): ICD-10-CM

## 2020-09-18 DIAGNOSIS — E66.09 CLASS 1 OBESITY DUE TO EXCESS CALORIES WITH SERIOUS COMORBIDITY AND BODY MASS INDEX (BMI) OF 30.0 TO 30.9 IN ADULT: ICD-10-CM

## 2020-09-18 DIAGNOSIS — I10 HTN (HYPERTENSION), BENIGN: ICD-10-CM

## 2020-09-18 PROBLEM — E66.811 CLASS 1 OBESITY DUE TO EXCESS CALORIES WITH SERIOUS COMORBIDITY AND BODY MASS INDEX (BMI) OF 30.0 TO 30.9 IN ADULT: Status: ACTIVE | Noted: 2019-07-08

## 2020-09-18 PROCEDURE — 99443 PR PHYS/QHP TELEPHONE EVALUATION 21-30 MIN: CPT | Performed by: FAMILY MEDICINE

## 2020-09-18 RX ORDER — IRBESARTAN 300 MG/1
300 TABLET ORAL DAILY
COMMUNITY
Start: 2020-09-01

## 2020-09-18 NOTE — PROGRESS NOTES
Subjective   Anthony Shelley is a 75 y.o. male.     Chief Complaint   Patient presents with   • Covid-19     follow up       Cough  This is a new problem. The current episode started 1 to 4 weeks ago. The problem has been resolved. Pertinent negatives include no chest pain, chills, ear congestion, ear pain, fever, headaches, heartburn, hemoptysis, myalgias, nasal congestion, postnasal drip, rash, rhinorrhea, sore throat, shortness of breath, sweats, weight loss or wheezing. Nothing aggravates the symptoms. Treatments tried: zpack. The treatment provided significant relief.            I personally reviewed and updated the patient's allergies, medications, problem list, and past medical, surgical, social, and family history. I have reviewed and confirmed the accuracy of the History of Present Illness and Review of Symptoms as documented by the MA/LPN/RN. Jonathan Johnson MD    Family History   Problem Relation Age of Onset   • Stroke Father    • Cancer Brother         Lymph node       Social History     Tobacco Use   • Smoking status: Former Smoker     Quit date: 2000     Years since quittin.7   • Smokeless tobacco: Never Used   Substance Use Topics   • Alcohol use: Yes   • Drug use: No       Past Surgical History:   Procedure Laterality Date   • CARDIAC CATHETERIZATION  2018   • CHOLECYSTECTOMY  1996       Patient Active Problem List   Diagnosis   • CAD (coronary artery disease)   • HTN (hypertension), benign   • Mixed hyperlipidemia   • Lumbar disc disease   • Class 1 obesity due to excess calories with serious comorbidity and body mass index (BMI) of 30.0 to 30.9 in adult   • Colon cancer screening   • Medicare annual wellness visit, subsequent   • Screening for prostate cancer   • Callus of foot   • Elevated LFTs   • Cellulitis of right lower extremity   • History of tobacco use   • COVID-19         Current Outpatient Medications:   •  amLODIPine (Norvasc) 5 MG tablet, Norvasc 5 mg tablet  Take 1  tablet every day by oral route at bedtime for 90 days., Disp: , Rfl:   •  aspirin 81 MG EC tablet, Take 1 tablet by mouth Daily., Disp: , Rfl:   •  azithromycin (ZITHROMAX) 250 MG tablet, Take 2 tablets the first day, then 1 tablet daily for 4 days., Disp: 6 tablet, Rfl: 0  •  cyclobenzaprine (FLEXERIL) 10 MG tablet, Take 0.5-1 tablets by mouth At Night As Needed., Disp: , Rfl:   •  fluorouracil (EFUDEX) 5 % cream, APPLY A SUFFICIENT AMOUNT TO COVER THE LESIONS IN THE AFFECTED AREA(S) OF SCALP AND FACE TWICE DAILY FOR 2 WEEKS, Disp: , Rfl: 1  •  irbesartan (AVAPRO) 300 MG tablet, Take 300 mg by mouth Daily., Disp: , Rfl:   •  metoprolol succinate XL (TOPROL-XL) 50 MG 24 hr tablet, metoprolol succinate ER 50 mg tablet,extended release 24 hr, Disp: , Rfl:   •  Multiple Vitamins-Minerals (CENTRUM ADULTS) tablet, Take 1 tablet by mouth Daily., Disp: , Rfl:   •  naproxen sodium (ALEVE) 220 MG tablet, Take 1 tablet by mouth Daily., Disp: , Rfl:   •  rosuvastatin (CRESTOR) 5 MG tablet, Take 1 tablet by mouth Daily., Disp: 30 tablet, Rfl: 12  •  silver sulfadiazine (SILVADENE) 1 % cream, Apply 1 application topically to the appropriate area as directed 2 (Two) Times a Day., Disp: , Rfl:   •  traMADol (ULTRAM) 50 MG tablet, tramadol 50 mg tablet, Disp: , Rfl:          Review of Systems   Constitutional: Negative for chills, diaphoresis, fever and unexpected weight loss.   HENT: Negative for ear pain, postnasal drip, rhinorrhea and sore throat.    Eyes: Negative for visual disturbance.   Respiratory: Positive for cough. Negative for hemoptysis, shortness of breath and wheezing.    Cardiovascular: Negative for chest pain and palpitations.   Gastrointestinal: Negative for abdominal pain and nausea.   Endocrine: Negative for polydipsia and polyphagia.   Musculoskeletal: Negative for myalgias and neck stiffness.   Skin: Negative for color change, pallor and rash.   Neurological: Negative for seizures and syncope.   Hematological:  Negative for adenopathy.       I have reviewed and confirmed the accuracy of the ROS as documented by the MA/LPN/RN Jonathan Johnson MD      Objective   There were no vitals taken for this visit.  BP Readings from Last 3 Encounters:   07/17/20 124/73   01/08/20 144/84   07/08/19 138/74     Wt Readings from Last 3 Encounters:   07/17/20 97.1 kg (214 lb)   02/20/20 96.6 kg (213 lb)   01/08/20 97 kg (213 lb 12.8 oz)     Physical Exam    Recent Lab Results:    No results found for: HGBA1C  Lab Results   Component Value Date    GLU 96 07/07/2020     Lab Results   Component Value Date    LDL 67 07/07/2020     (H) 01/08/2020     (H) 04/16/2018     Lab Results   Component Value Date    CHOL 232 (H) 04/16/2018     Lab Results   Component Value Date    TRIG 104 07/07/2020    TRIG 156 (H) 01/08/2020    TRIG 156 (H) 04/16/2018     Lab Results   Component Value Date    HDL 67 07/07/2020    HDL 58 01/08/2020    HDL 55 04/16/2018     Lab Results   Component Value Date    PSA 1.5 04/16/2018     Lab Results   Component Value Date    WBC 6.1 07/07/2020    HGB 14.5 07/07/2020    HCT 42.5 07/07/2020    MCV 96 07/07/2020     07/07/2020     Lab Results   Component Value Date    TSH 6.440 (H) 07/07/2020      Lab Results   Component Value Date    BUN 11 07/07/2020    CREATININE 0.97 07/07/2020    EGFRIFNONA 76 07/07/2020    EGFRIFAFRI 88 07/07/2020    BCR 11 07/07/2020    K 4.8 07/07/2020    CO2 26 07/07/2020    CALCIUM 9.3 07/07/2020    PROTENTOTREF 6.7 07/07/2020    ALBUMIN 4.4 07/07/2020    LABIL2 1.9 07/07/2020    AST 42 (H) 07/07/2020    ALT 53 (H) 07/07/2020     No results found for: BELKIS, RF, SEDRATE   No results found for: CRP   Lab Results   Component Value Date    TIBC 305 07/24/2020    FERRITIN 551 (H) 07/24/2020      No results found for: [id number]     Anthony Shelley consented to undergo a telephone visit today.  This format was necessitated by the current covid-19 virus pandemic.  23 minutes was spent on  the phone discussing his acute concerns and chronic medical problems.  Assessment/Plan      Medications        Problem List         LOS    COVID-19 viral infection.  Clinically improved/symptoms resolving.  Has completed azithromycin.  Contracted from his wife who was hospitalized.  Complete quarantine.  Call return if fever or worsening symptoms.  Health maintenance. Doing well, vaccines current.  Coated baby aspirin daily.  Discussed health maintenance, screening test, lifestyle modification.  Followed by dermatology for yearly skin exams.  Hypertension.    Improved today.  Discussed low-sodium diet.  Follow-up recheck  Hyperlipidemia.    Much improved today back on rosuvastatin 5 mg daily.  Discussed diet, exercise, lifestyle modification.  Aggressive LDL target considering CAD.  Positive mild elevation LFTs, benefit statin outweighs risk.  Elevated LFTs.  Mild.  Benefit statin outweighs risk.  Check blood work/right upper quadrant ultrasound.  Follow-up recheck.  Coronary artery disease.  History of complete obstruction LAD with collaterals.  Continue risk factor reduction.  Followed by cardiology Dr. Odom, has had repeat stress testing March/2020..  Cellulitis right lower extremity.  Complicating hematoma.  Improved/resolved, wound healed, status post course wound care.  Neck pain.  Likely secondary to OA.  Ice, rehabilitation exercises discussed.  DDX includes cervical disc disease, consider imaging if persistent symptoms.  Carotid stenosis.  Mild per ultrasound 2018.  Plan to repeat in 3 years.  Screening for colon cancer.  Colonoscopy benign 2018.        Problem List Items Addressed This Visit        Unprioritized    CAD (coronary artery disease)    HTN (hypertension), benign    Relevant Medications    irbesartan (AVAPRO) 300 MG tablet    Class 1 obesity due to excess calories with serious comorbidity and body mass index (BMI) of 30.0 to 30.9 in adult    History of tobacco use    COVID-19 - Primary               Expected course, medications, and adverse effects discussed.  Call or return if worsening or persistent symptoms.

## 2020-12-14 DIAGNOSIS — E78.2 MIXED HYPERLIPIDEMIA: ICD-10-CM

## 2020-12-15 RX ORDER — ROSUVASTATIN CALCIUM 5 MG/1
5 TABLET, COATED ORAL DAILY
Qty: 30 TABLET | Refills: 12 | Status: SHIPPED | OUTPATIENT
Start: 2020-12-15 | End: 2021-01-11 | Stop reason: SDUPTHER

## 2020-12-15 RX ORDER — METOPROLOL SUCCINATE 50 MG/1
50 TABLET, EXTENDED RELEASE ORAL DAILY
Qty: 30 TABLET | Refills: 2 | Status: SHIPPED | OUTPATIENT
Start: 2020-12-15 | End: 2021-01-11 | Stop reason: SDUPTHER

## 2021-01-11 DIAGNOSIS — E78.2 MIXED HYPERLIPIDEMIA: ICD-10-CM

## 2021-01-11 DIAGNOSIS — I10 HTN (HYPERTENSION), BENIGN: Primary | ICD-10-CM

## 2021-01-11 RX ORDER — ROSUVASTATIN CALCIUM 5 MG/1
5 TABLET, COATED ORAL DAILY
Qty: 30 TABLET | Refills: 0 | Status: SHIPPED | OUTPATIENT
Start: 2021-01-11 | End: 2021-01-22 | Stop reason: SDUPTHER

## 2021-01-11 RX ORDER — METOPROLOL SUCCINATE 50 MG/1
50 TABLET, EXTENDED RELEASE ORAL DAILY
Qty: 30 TABLET | Refills: 0 | Status: SHIPPED | OUTPATIENT
Start: 2021-01-11 | End: 2021-01-22 | Stop reason: SDUPTHER

## 2021-01-22 ENCOUNTER — OFFICE VISIT (OUTPATIENT)
Dept: FAMILY MEDICINE CLINIC | Facility: CLINIC | Age: 76
End: 2021-01-22

## 2021-01-22 VITALS
BODY MASS INDEX: 32.18 KG/M2 | TEMPERATURE: 98.2 F | RESPIRATION RATE: 18 BRPM | HEIGHT: 70 IN | HEART RATE: 78 BPM | OXYGEN SATURATION: 99 % | WEIGHT: 224.8 LBS

## 2021-01-22 DIAGNOSIS — Z23 NEED FOR INFLUENZA VACCINATION: ICD-10-CM

## 2021-01-22 DIAGNOSIS — E78.2 MIXED HYPERLIPIDEMIA: ICD-10-CM

## 2021-01-22 DIAGNOSIS — R79.89 ELEVATED LFTS: Primary | ICD-10-CM

## 2021-01-22 DIAGNOSIS — Z87.891 HISTORY OF TOBACCO USE: ICD-10-CM

## 2021-01-22 DIAGNOSIS — I25.119 CORONARY ARTERY DISEASE INVOLVING NATIVE HEART WITH ANGINA PECTORIS, UNSPECIFIED VESSEL OR LESION TYPE (HCC): ICD-10-CM

## 2021-01-22 DIAGNOSIS — I10 HTN (HYPERTENSION), BENIGN: ICD-10-CM

## 2021-01-22 DIAGNOSIS — E66.09 CLASS 1 OBESITY DUE TO EXCESS CALORIES WITH SERIOUS COMORBIDITY AND BODY MASS INDEX (BMI) OF 30.0 TO 30.9 IN ADULT: ICD-10-CM

## 2021-01-22 PROCEDURE — 99214 OFFICE O/P EST MOD 30 MIN: CPT | Performed by: FAMILY MEDICINE

## 2021-01-22 PROCEDURE — 90694 VACC AIIV4 NO PRSRV 0.5ML IM: CPT | Performed by: FAMILY MEDICINE

## 2021-01-22 PROCEDURE — G0008 ADMIN INFLUENZA VIRUS VAC: HCPCS | Performed by: FAMILY MEDICINE

## 2021-01-22 RX ORDER — METOPROLOL SUCCINATE 50 MG/1
50 TABLET, EXTENDED RELEASE ORAL DAILY
Qty: 90 TABLET | Refills: 1 | Status: SHIPPED | OUTPATIENT
Start: 2021-01-22 | End: 2021-06-11

## 2021-01-22 RX ORDER — ROSUVASTATIN CALCIUM 5 MG/1
5 TABLET, COATED ORAL DAILY
Qty: 90 TABLET | Refills: 1 | Status: SHIPPED | OUTPATIENT
Start: 2021-01-22 | End: 2021-06-11

## 2021-01-22 NOTE — PROGRESS NOTES
Subjective   Anthony Shelley is a 75 y.o. male.     Chief Complaint   Patient presents with   • Hypertension   • Hyperlipidemia       Hypertension  This is a chronic problem. The current episode started more than 1 year ago. The problem has been gradually improving since onset. The problem is controlled. Pertinent negatives include no chest pain, palpitations or shortness of breath. There are no associated agents to hypertension. Risk factors for coronary artery disease include male gender and obesity. Current antihypertension treatment includes beta blockers (irbesartan 300mg daily). The current treatment provides mild improvement. There are no compliance problems.    Hyperlipidemia  This is a chronic problem. The current episode started more than 1 year ago. The problem is controlled. Recent lipid tests were reviewed and are normal. There are no known factors aggravating his hyperlipidemia. Pertinent negatives include no chest pain or shortness of breath. Current antihyperlipidemic treatment includes diet change. There are no compliance problems.  Risk factors for coronary artery disease include hypertension and male sex.            I personally reviewed and updated the patient's allergies, medications, problem list, and past medical, surgical, social, and family history. I have reviewed and confirmed the accuracy of the History of Present Illness and Review of Symptoms as documented by the MA/LPN/RN. Jonathan Johnson MD    Family History   Problem Relation Age of Onset   • Stroke Father    • Cancer Brother         Lymph node       Social History     Tobacco Use   • Smoking status: Former Smoker     Quit date: 2000     Years since quittin.0   • Smokeless tobacco: Never Used   Substance Use Topics   • Alcohol use: Yes     Frequency: Monthly or less   • Drug use: No       Past Surgical History:   Procedure Laterality Date   • CARDIAC CATHETERIZATION  2018   • CHOLECYSTECTOMY         Patient Active  Problem List   Diagnosis   • CAD (coronary artery disease)   • HTN (hypertension), benign   • Mixed hyperlipidemia   • Lumbar disc disease   • Class 1 obesity due to excess calories with serious comorbidity and body mass index (BMI) of 30.0 to 30.9 in adult   • Colon cancer screening   • Medicare annual wellness visit, subsequent   • Screening for prostate cancer   • Callus of foot   • Elevated LFTs   • Cellulitis of right lower extremity   • History of tobacco use   • COVID-19         Current Outpatient Medications:   •  amLODIPine (Norvasc) 5 MG tablet, Norvasc 5 mg tablet  Take 1 tablet every day by oral route at bedtime for 90 days., Disp: , Rfl:   •  aspirin 81 MG EC tablet, Take 1 tablet by mouth Daily., Disp: , Rfl:   •  irbesartan (AVAPRO) 300 MG tablet, Take 300 mg by mouth Daily., Disp: , Rfl:   •  metoprolol succinate XL (TOPROL-XL) 50 MG 24 hr tablet, Take 1 tablet by mouth Daily for 30 days., Disp: 90 tablet, Rfl: 1  •  Multiple Vitamins-Minerals (CENTRUM ADULTS) tablet, Take 1 tablet by mouth Daily., Disp: , Rfl:   •  naproxen sodium (ALEVE) 220 MG tablet, Take 1 tablet by mouth Daily., Disp: , Rfl:   •  rosuvastatin (CRESTOR) 5 MG tablet, Take 1 tablet by mouth Daily., Disp: 90 tablet, Rfl: 1  •  silver sulfadiazine (SILVADENE) 1 % cream, Apply 1 application topically to the appropriate area as directed 2 (Two) Times a Day., Disp: , Rfl:   •  traMADol (ULTRAM) 50 MG tablet, tramadol 50 mg tablet, Disp: , Rfl:          Review of Systems   Constitutional: Negative for chills and diaphoresis.   Eyes: Negative for visual disturbance.   Respiratory: Negative for shortness of breath.    Cardiovascular: Negative for chest pain and palpitations.   Gastrointestinal: Negative for abdominal pain and nausea.   Endocrine: Negative for polydipsia and polyphagia.   Musculoskeletal: Negative for neck stiffness.   Skin: Negative for color change and pallor.   Neurological: Negative for seizures and syncope.  "  Hematological: Negative for adenopathy.       I have reviewed and confirmed the accuracy of the ROS as documented by the MA/LPN/RN Jonathan Johnson MD      Objective   Pulse 78   Temp 98.2 °F (36.8 °C)   Resp 18   Ht 177.8 cm (70\")   Wt 102 kg (224 lb 12.8 oz)   SpO2 99%   BMI 32.26 kg/m²   BP Readings from Last 3 Encounters:   07/17/20 124/73   01/08/20 144/84   07/08/19 138/74     Wt Readings from Last 3 Encounters:   01/22/21 102 kg (224 lb 12.8 oz)   07/17/20 97.1 kg (214 lb)   02/20/20 96.6 kg (213 lb)     Physical Exam  Constitutional:       Appearance: Normal appearance. He is well-developed. He is not diaphoretic.   Cardiovascular:      Rate and Rhythm: Normal rate and regular rhythm.      Pulses: Normal pulses.      Heart sounds: Normal heart sounds, S1 normal and S2 normal. No murmur. No friction rub. No gallop.    Pulmonary:      Effort: Pulmonary effort is normal. No accessory muscle usage.      Breath sounds: Normal breath sounds. No stridor. No decreased breath sounds, wheezing, rhonchi or rales.   Abdominal:      General: Bowel sounds are normal. There is no distension.      Palpations: Abdomen is soft. Abdomen is not rigid. There is no mass or pulsatile mass.      Tenderness: There is no abdominal tenderness. There is no guarding or rebound. Negative signs include Thomas's sign.      Hernia: No hernia is present.   Skin:     General: Skin is warm and dry.      Coloration: Skin is not pale.   Neurological:      Mental Status: He is alert and oriented to person, place, and time.      Coordination: Coordination normal.      Gait: Gait normal.         Data / Lab Results:    No results found for: HGBA1C  Lab Results   Component Value Date     (H) 01/22/2021     Lab Results   Component Value Date    LDL 67 07/07/2020     (H) 01/08/2020     (H) 04/16/2018     Lab Results   Component Value Date    CHOL 232 (H) 04/16/2018     Lab Results   Component Value Date    TRIG 104 07/07/2020 "    TRIG 156 (H) 01/08/2020    TRIG 156 (H) 04/16/2018     Lab Results   Component Value Date    HDL 67 07/07/2020    HDL 58 01/08/2020    HDL 55 04/16/2018     Lab Results   Component Value Date    PSA 1.5 04/16/2018     Lab Results   Component Value Date    WBC 6.1 07/07/2020    HGB 14.5 07/07/2020    HCT 42.5 07/07/2020    MCV 96 07/07/2020     07/07/2020     Lab Results   Component Value Date    TSH 6.440 (H) 07/07/2020      Lab Results   Component Value Date    BUN 11 01/22/2021    CREATININE 1.17 01/22/2021    EGFRIFNONA 61 01/22/2021    EGFRIFAFRI 70 01/22/2021    BCR 9 (L) 01/22/2021    K 5.4 (H) 01/22/2021    CO2 24 01/22/2021    CALCIUM 9.5 01/22/2021    PROTENTOTREF 7.0 01/22/2021    ALBUMIN 4.5 01/22/2021    LABIL2 1.8 01/22/2021    AST 48 (H) 01/22/2021    ALT 61 (H) 01/22/2021     No results found for: BELKIS, RF, SEDRATE   No results found for: CRP   Lab Results   Component Value Date    TIBC 305 07/24/2020    FERRITIN 551 (H) 07/24/2020      No results found for: [id number]       Assessment/Plan      Medications        Problem List         LOS    COVID-19 viral infection.  Clinically improved/symptoms resolved.  Has completed quarantine.  Health maintenance. Doing well, vaccines current.  Coated baby aspirin daily.  Discussed health maintenance, screening test, lifestyle modification.  Followed by dermatology for yearly skin exams.  Hypertension.    Improved today.  Discussed low-sodium diet.  Follow-up recheck  Hyperlipidemia.    Much improved today back on rosuvastatin 5 mg daily.  Discussed diet, exercise, lifestyle modification.  Aggressive LDL target considering CAD.  Positive mild elevation LFTs, benefit statin outweighs risk.  Elevated LFTs.  Mild.  Benefit statin outweighs risk.  reCheck blood work/ plan right upper quadrant ultrasound if persistent elevation.  Drinking 6 beers per day/.  Discontinue/moderate.  Follow-up recheck.  Coronary artery disease.  History of complete obstruction  LAD with collaterals.  Continue risk factor reduction.  Followed by cardiology Dr. Odom, has had repeat stress testing March/2020..  Cellulitis right lower extremity.  Complicating hematoma.  Improved/resolved, wound healed, status post course wound care.  Neck pain.  Likely secondary to OA.  Ice, rehabilitation exercises discussed.  DDX includes cervical disc disease, consider imaging if persistent symptoms.  Carotid stenosis.  Mild per ultrasound 2018.  Plan to repeat in 3 years.  Screening for colon cancer.  Colonoscopy benign 2018.  Alcohol abuse.  Drinking 6 beers per day currently.  Encourage cessation.            Diagnoses and all orders for this visit:    1. Elevated LFTs (Primary)  -     Comprehensive Metabolic Panel    2. HTN (hypertension), benign  -     metoprolol succinate XL (TOPROL-XL) 50 MG 24 hr tablet; Take 1 tablet by mouth Daily for 30 days.  Dispense: 90 tablet; Refill: 1  -     Comprehensive Metabolic Panel    3. Mixed hyperlipidemia  -     rosuvastatin (CRESTOR) 5 MG tablet; Take 1 tablet by mouth Daily.  Dispense: 90 tablet; Refill: 1    4. Class 1 obesity due to excess calories with serious comorbidity and body mass index (BMI) of 30.0 to 30.9 in adult    5. History of tobacco use    6. Need for influenza vaccination  -     Fluad Quad >65 years    7. Coronary artery disease involving native heart with angina pectoris, unspecified vessel or lesion type (CMS/HCC)              Expected course, medications, and adverse effects discussed.  Call or return if worsening or persistent symptoms.  I wore protective equipment throughout this patient encounter including a mask, gloves, and eye protection.  Hand hygiene was performed before donning protective equipment and after removal when leaving the room. The complete contents of the Assessment and Plan and Data/Lab Results as documented above have been reviewed and addressed by myself with the patient today as part of an ongoing evaluation /  treatment plan.  If some of the documentation has been copied from a previous note and is unchanged it indicates that this problem / plan has been assessed today but is stable from a previous visit and no changes have been recommended.

## 2021-01-23 LAB
ALBUMIN SERPL-MCNC: 4.5 G/DL (ref 3.7–4.7)
ALBUMIN/GLOB SERPL: 1.8 {RATIO} (ref 1.2–2.2)
ALP SERPL-CCNC: 113 IU/L (ref 39–117)
ALT SERPL-CCNC: 61 IU/L (ref 0–44)
AST SERPL-CCNC: 48 IU/L (ref 0–40)
BILIRUB SERPL-MCNC: 0.9 MG/DL (ref 0–1.2)
BUN SERPL-MCNC: 11 MG/DL (ref 8–27)
BUN/CREAT SERPL: 9 (ref 10–24)
CALCIUM SERPL-MCNC: 9.5 MG/DL (ref 8.6–10.2)
CHLORIDE SERPL-SCNC: 105 MMOL/L (ref 96–106)
CO2 SERPL-SCNC: 24 MMOL/L (ref 20–29)
CREAT SERPL-MCNC: 1.17 MG/DL (ref 0.76–1.27)
GLOBULIN SER CALC-MCNC: 2.5 G/DL (ref 1.5–4.5)
GLUCOSE SERPL-MCNC: 103 MG/DL (ref 65–99)
POTASSIUM SERPL-SCNC: 5.4 MMOL/L (ref 3.5–5.2)
PROT SERPL-MCNC: 7 G/DL (ref 6–8.5)
SODIUM SERPL-SCNC: 142 MMOL/L (ref 134–144)

## 2021-01-25 ENCOUNTER — TELEPHONE (OUTPATIENT)
Dept: FAMILY MEDICINE CLINIC | Facility: CLINIC | Age: 76
End: 2021-01-25

## 2021-01-25 DIAGNOSIS — R79.89 ELEVATED LFTS: Primary | ICD-10-CM

## 2021-01-25 DIAGNOSIS — R10.11 RUQ PAIN: ICD-10-CM

## 2021-01-25 NOTE — TELEPHONE ENCOUNTER
----- Message from Jonathan Johnson MD sent at 1/23/2021  8:32 AM EST -----  Let him know his liver function remains mildly elevated, about the same as the last check, get him set up for right upper quadrant ultrasound, want him to cut down on his beer intake, thanks

## 2021-01-29 ENCOUNTER — TELEPHONE (OUTPATIENT)
Dept: FAMILY MEDICINE CLINIC | Facility: CLINIC | Age: 76
End: 2021-01-29

## 2021-01-29 NOTE — TELEPHONE ENCOUNTER
I called and left another message today. Patient has called twice this week. He qustioned why we ordered an ultrasound of his gakllbladder when he doesn't have one. We ordred a right upper quadrant ultrasound. wgile this is to normally look at the gallbladder we ordered it to see his liver due to his elevated liver function tests. Which I spoke to him about recently. He needs to return my call so I can explain this to him again.

## 2021-02-05 ENCOUNTER — HOSPITAL ENCOUNTER (OUTPATIENT)
Dept: ULTRASOUND IMAGING | Facility: HOSPITAL | Age: 76
Discharge: HOME | End: 2021-02-05
Admitting: FAMILY MEDICINE

## 2021-02-05 DIAGNOSIS — R10.11 RUQ PAIN: ICD-10-CM

## 2021-02-05 DIAGNOSIS — R79.89 ELEVATED LFTS: ICD-10-CM

## 2021-02-05 PROCEDURE — 76705 ECHO EXAM OF ABDOMEN: CPT

## 2021-02-08 ENCOUNTER — TELEPHONE (OUTPATIENT)
Dept: FAMILY MEDICINE CLINIC | Facility: CLINIC | Age: 76
End: 2021-02-08

## 2021-02-08 NOTE — TELEPHONE ENCOUNTER
----- Message from Jonathan Johnson MD sent at 2/7/2021  7:31 AM EST -----  Let him know his ultrasound does show he has some fatty buildup in his liver which can cause the mild elevation of his liver function tests, want him to keep taking 4 g of fish oil daily as planned, will continue to monitor his liver function testing, thanks

## 2021-05-19 ENCOUNTER — OFFICE VISIT (OUTPATIENT)
Dept: FAMILY MEDICINE CLINIC | Facility: CLINIC | Age: 76
End: 2021-05-19

## 2021-05-19 VITALS
SYSTOLIC BLOOD PRESSURE: 126 MMHG | WEIGHT: 224.4 LBS | TEMPERATURE: 98.6 F | DIASTOLIC BLOOD PRESSURE: 80 MMHG | RESPIRATION RATE: 18 BRPM | HEIGHT: 71 IN | HEART RATE: 76 BPM | OXYGEN SATURATION: 99 % | BODY MASS INDEX: 31.42 KG/M2

## 2021-05-19 DIAGNOSIS — H92.21 BLEEDING FROM RIGHT EAR: ICD-10-CM

## 2021-05-19 DIAGNOSIS — I25.119 CORONARY ARTERY DISEASE INVOLVING NATIVE HEART WITH ANGINA PECTORIS, UNSPECIFIED VESSEL OR LESION TYPE (HCC): ICD-10-CM

## 2021-05-19 DIAGNOSIS — H92.21 OTORRHAGIA OF RIGHT EAR: Primary | ICD-10-CM

## 2021-05-19 DIAGNOSIS — Z87.891 HISTORY OF TOBACCO USE: ICD-10-CM

## 2021-05-19 DIAGNOSIS — E66.09 CLASS 1 OBESITY DUE TO EXCESS CALORIES WITH SERIOUS COMORBIDITY AND BODY MASS INDEX (BMI) OF 30.0 TO 30.9 IN ADULT: ICD-10-CM

## 2021-05-19 DIAGNOSIS — I10 HTN (HYPERTENSION), BENIGN: ICD-10-CM

## 2021-05-19 PROCEDURE — 99213 OFFICE O/P EST LOW 20 MIN: CPT | Performed by: FAMILY MEDICINE

## 2021-06-10 DIAGNOSIS — E78.2 MIXED HYPERLIPIDEMIA: ICD-10-CM

## 2021-06-10 DIAGNOSIS — I10 HTN (HYPERTENSION), BENIGN: ICD-10-CM

## 2021-06-11 RX ORDER — METOPROLOL SUCCINATE 50 MG/1
TABLET, EXTENDED RELEASE ORAL
Qty: 90 TABLET | Refills: 1 | Status: SHIPPED | OUTPATIENT
Start: 2021-06-11

## 2021-06-11 RX ORDER — ROSUVASTATIN CALCIUM 5 MG/1
TABLET, COATED ORAL
Qty: 90 TABLET | Refills: 1 | Status: SHIPPED | OUTPATIENT
Start: 2021-06-11 | End: 2022-01-13

## 2021-07-19 NOTE — PROGRESS NOTES
Subjective   Anthony Shelley is a 76 y.o. male.     Chief Complaint   Patient presents with   • Medicare Wellness-subsequent   • Hypertension   • Hyperlipidemia   • Coronary Artery Disease       The patient is here: for coordination of medical care to discuss health maintenance and disease prevention to follow up on multiple medical problems.  Last comprehensive physical was on 7/17/2020.  Previous physical was performed by  Jonathan Johnson MD  Overall has: moderate activity with work/home activities, exercises 2 - 3 times per week, good appetite, feels well with minor complaints, good energy level and is sleeping well. Nutrition: eating a variety of foods. Last tetanus shot was 7/10/2017. Patient's last Carotid Doppler was 10/15/2018. His last Stress test was 3/12/2020. Patient's last colonoscopy was done at [facility] on 6/28/2018 (no polyps were removed no polyps were removed). His last PSA was 2.6 on 7/1/2019.     Coronary Artery Disease  Presents for follow-up visit. Pertinent negatives include no chest pain, chest pressure, chest tightness, dizziness, leg swelling, muscle weakness, palpitations, shortness of breath or weight gain. Risk factors include hyperlipidemia and obesity. Compliance with diet is good. Compliance with exercise is good. Compliance with medications is good.   Hypertension  This is a chronic problem. The current episode started more than 1 year ago. The problem has been gradually improving since onset. The problem is controlled. Associated symptoms include sweats. Pertinent negatives include no anxiety, blurred vision, chest pain, headaches, malaise/fatigue, neck pain, orthopnea, palpitations, peripheral edema, PND or shortness of breath. There are no associated agents to hypertension. Risk factors for coronary artery disease include dyslipidemia, male gender and obesity. Current antihypertension treatment includes angiotensin blockers, calcium channel blockers and beta  blockers. The current treatment provides mild improvement. Hypertensive end-organ damage includes CAD/MI.   Hyperlipidemia  This is a chronic problem. The current episode started more than 1 year ago. The problem is controlled. Recent lipid tests were reviewed and are normal. Exacerbating diseases include obesity. There are no known factors aggravating his hyperlipidemia. Pertinent negatives include no chest pain, leg pain or shortness of breath. Current antihyperlipidemic treatment includes diet change. There are no compliance problems.  Risk factors for coronary artery disease include hypertension, male sex and obesity.        Recent Hospitalizations:  No hospitalization(s) within the last year..  ccc    I personally reviewed and updated the patient's allergies, medications, problem list, and past medical, surgical, social, and family history. I have reviewed and confirmed the accuracy of the HPI and ROS as documented by the MA/LPN/RN Jonathan Johnson MD      Family History   Problem Relation Age of Onset   • Stroke Father    • Cancer Brother         Lymph node   • No Known Problems Mother    • No Known Problems Sister        Social History     Tobacco Use   • Smoking status: Former Smoker     Start date:      Quit date: 2000     Years since quittin.5   • Smokeless tobacco: Never Used   Vaping Use   • Vaping Use: Never used   Substance Use Topics   • Alcohol use: Yes     Comment: occasional   • Drug use: No       Past Surgical History:   Procedure Laterality Date   • CARDIAC CATHETERIZATION  2018   • CHOLECYSTECTOMY         Patient Active Problem List   Diagnosis   • Coronary artery disease involving native heart   • HTN (hypertension), benign   • Mixed hyperlipidemia   • Lumbar disc disease   • Class 1 obesity due to excess calories with serious comorbidity and body mass index (BMI) of 30.0 to 30.9 in adult   • Colon cancer screening   • Medicare annual wellness visit, subsequent   •  "Screening for prostate cancer   • Callus of foot   • Elevated LFTs   • Cellulitis of right lower extremity   • History of tobacco use         Current Outpatient Medications:   •  amLODIPine (Norvasc) 5 MG tablet, Norvasc 5 mg tablet  Take 1 tablet every day by oral route at bedtime for 90 days., Disp: , Rfl:   •  aspirin 81 MG EC tablet, Take 1 tablet by mouth Daily., Disp: , Rfl:   •  irbesartan (AVAPRO) 300 MG tablet, Take 300 mg by mouth Daily., Disp: , Rfl:   •  metoprolol succinate XL (TOPROL-XL) 50 MG 24 hr tablet, TAKE 1 TABLET EVERY DAY, Disp: 90 tablet, Rfl: 1  •  Multiple Vitamins-Minerals (CENTRUM ADULTS) tablet, Take 1 tablet by mouth Daily., Disp: , Rfl:   •  rosuvastatin (CRESTOR) 5 MG tablet, TAKE 1 TABLET EVERY DAY, Disp: 90 tablet, Rfl: 1         Review of Systems   Constitutional: Negative for chills, diaphoresis, malaise/fatigue and unexpected weight gain.   HENT: Negative for trouble swallowing and voice change.    Eyes: Negative for blurred vision and visual disturbance.   Respiratory: Negative for chest tightness and shortness of breath.    Cardiovascular: Negative for chest pain, palpitations, orthopnea, leg swelling and PND.   Gastrointestinal: Negative for abdominal pain and nausea.   Endocrine: Negative for polydipsia and polyphagia.   Genitourinary: Negative for hematuria.   Musculoskeletal: Negative for muscle weakness, neck pain and neck stiffness.   Skin: Negative for color change and pallor.   Allergic/Immunologic: Negative for immunocompromised state.   Neurological: Negative for dizziness, seizures and syncope.   Hematological: Negative for adenopathy.   Psychiatric/Behavioral: Negative for sleep disturbance and suicidal ideas.       I have reviewed and confirmed the accuracy of the ROS as documented by the MA/LPN/RN Jonathan Johnson MD      Objective   /68   Pulse 68   Temp 97.3 °F (36.3 °C)   Resp 18   Ht 179.1 cm (70.5\")   Wt 101 kg (222 lb)   SpO2 97%   BMI 31.40 kg/m² "   BP Readings from Last 3 Encounters:   07/20/21 120/68   05/19/21 126/80   07/17/20 124/73     Wt Readings from Last 3 Encounters:   07/20/21 101 kg (222 lb)   05/19/21 102 kg (224 lb 6.4 oz)   01/22/21 102 kg (224 lb 12.8 oz)     Physical Exam  Constitutional:       Appearance: He is well-developed. He is not diaphoretic.   HENT:      Head: Normocephalic.      Right Ear: Tympanic membrane, ear canal and external ear normal.      Left Ear: Tympanic membrane, ear canal and external ear normal.      Nose: Nose normal.   Eyes:      General: Lids are normal.      Conjunctiva/sclera: Conjunctivae normal.      Pupils: Pupils are equal, round, and reactive to light.   Neck:      Thyroid: No thyromegaly.      Vascular: No carotid bruit or JVD.      Trachea: No tracheal deviation.   Cardiovascular:      Rate and Rhythm: Normal rate and regular rhythm.      Heart sounds: Normal heart sounds. No murmur heard.   No friction rub. No gallop.    Pulmonary:      Effort: Pulmonary effort is normal.      Breath sounds: Normal breath sounds. No stridor. No decreased breath sounds, wheezing or rales.   Abdominal:      General: Bowel sounds are normal. There is no distension.      Palpations: Abdomen is soft. There is no mass.      Tenderness: There is no abdominal tenderness. There is no guarding or rebound.      Hernia: No hernia is present.   Lymphadenopathy:      Head:      Right side of head: No submental, submandibular, tonsillar, preauricular, posterior auricular or occipital adenopathy.      Left side of head: No submental, submandibular, tonsillar, preauricular, posterior auricular or occipital adenopathy.      Cervical: No cervical adenopathy.   Skin:     General: Skin is warm and dry.      Coloration: Skin is not pale.   Neurological:      Mental Status: He is alert and oriented to person, place, and time.      Cranial Nerves: No cranial nerve deficit.      Sensory: No sensory deficit.      Coordination: Coordination normal.       Gait: Gait normal.      Deep Tendon Reflexes: Reflexes are normal and symmetric.         Data / Lab Results:    No results found for: HGBA1C  Lab Results   Component Value Date     (H) 01/22/2021     Lab Results   Component Value Date    LDL 67 07/07/2020     (H) 01/08/2020     (H) 04/16/2018     Lab Results   Component Value Date    CHOL 232 (H) 04/16/2018     Lab Results   Component Value Date    TRIG 104 07/07/2020    TRIG 156 (H) 01/08/2020    TRIG 156 (H) 04/16/2018     Lab Results   Component Value Date    HDL 67 07/07/2020    HDL 58 01/08/2020    HDL 55 04/16/2018     Lab Results   Component Value Date    PSA 1.5 04/16/2018     Lab Results   Component Value Date    WBC 6.1 07/07/2020    HGB 14.5 07/07/2020    HCT 42.5 07/07/2020    MCV 96 07/07/2020     07/07/2020     Lab Results   Component Value Date    TSH 6.440 (H) 07/07/2020      Lab Results   Component Value Date    BUN 11 01/22/2021    CREATININE 1.17 01/22/2021    EGFRIFNONA 61 01/22/2021    EGFRIFAFRI 70 01/22/2021    BCR 9 (L) 01/22/2021    K 5.4 (H) 01/22/2021    CO2 24 01/22/2021    CALCIUM 9.5 01/22/2021    PROTENTOTREF 7.0 01/22/2021    ALBUMIN 4.5 01/22/2021    LABIL2 1.8 01/22/2021    AST 48 (H) 01/22/2021    ALT 61 (H) 01/22/2021     No results found for: BELKIS, RF, SEDRATE   No results found for: CRP   Lab Results   Component Value Date    TIBC 305 07/24/2020    FERRITIN 551 (H) 07/24/2020      No results found for: [id number]       Age-appropriate Screening Schedule:  Refer to the list below for future screening recommendations based on patient's age, sex and/or medical conditions. Orders for these recommended tests are listed in the plan section. The patient has been provided with a written plan.    Health Maintenance   Topic Date Due   • TDAP/TD VACCINES (1 - Tdap) Never done   • ZOSTER VACCINE (1 of 2) Never done   • LIPID PANEL  07/07/2021   • INFLUENZA VACCINE  10/01/2021       Depression Screen:    PHQ-2/PHQ-9 Depression Screening 7/20/2021   Little interest or pleasure in doing things 0   Feeling down, depressed, or hopeless 0   Trouble falling or staying asleep, or sleeping too much 0   Feeling tired or having little energy 0   Poor appetite or overeating 0   Feeling bad about yourself - or that you are a failure or have let yourself or your family down 0   Trouble concentrating on things, such as reading the newspaper or watching television 0   Moving or speaking so slowly that other people could have noticed. Or the opposite - being so fidgety or restless that you have been moving around a lot more than usual 0   Thoughts that you would be better off dead, or of hurting yourself in some way 0   Total Score 0   If you checked off any problems, how difficult have these problems made it for you to do your work, take care of things at home, or get along with other people? Not difficult at all     I spent more than 16 minutes asking patient questions, counseling and documenting in the chart.    Health Habits and Functional and Cognitive Screening:  Functional & Cognitive Status 7/20/2021   Do you have difficulty preparing food and eating? No   Do you have difficulty bathing yourself, getting dressed or grooming yourself? No   Do you have difficulty using the toilet? No   Do you have difficulty moving around from place to place? No   Do you have trouble with steps or getting out of a bed or a chair? No   Current Diet Well Balanced Diet   Dental Exam Up to date        Dental Exam Comment -   Eye Exam Up to date        Eye Exam Comment -   Exercise (times per week) 7 times per week   Current Exercises Include Walking;Yard Work   Current Exercise Activities Include -   Do you need help using the phone?  No   Are you deaf or do you have serious difficulty hearing?  No   Do you need help with transportation? No   Do you need help shopping? No   Do you need help preparing meals?  No   Do you need help with  housework?  No   Do you need help with laundry? No   Do you need help taking your medications? No   Do you need help managing money? No   Do you ever drive or ride in a car without wearing a seat belt? No   Have you felt unusual stress, anger or loneliness in the last month? No   Who do you live with? Spouse   If you need help, do you have trouble finding someone available to you? No   Have you been bothered in the last four weeks by sexual problems? -   Do you have difficulty concentrating, remembering or making decisions? No       Does the patient have evidence of cognitive impairment? No    Advance Care Planning:  ACP discussion was held with the patient during this visit. Patient does not have an advance directive, information provided.     A face-to-face visit was completed today with patient.  Counseling explanation, and discussion of advanced directives was performed.   The last advanced care visit was performed in 2020.  In a near life ending situation, from which he is not expected to recover functionally, and he is not able to speak for him, he does not want life sustaining measures. We discussed feeding tubes, ventilators and cardiac support as well as dialysis.    I spent more than 16 minutes discussing Advanced Care Planning information and documenting in the chart.    Identification of Risk Factors:  Risk factors include: Abdominal Aortic Aneurysm Screening  Advance Directive Discussion  Cardiovascular risk  Colon Cancer Screening  Fall Risk  Immunizations Discussed/Encouraged (specific immunizations; Shingrix )  Lung Cancer Risk  Obesity/Overweight   Prostate Cancer Screening .    Compared to one year ago, the patient feels his physical health is better.  Compared to one year ago, the patient feels his mental health is better.    Patient Self-Management and Personalized Health Advice  The patient has been provided with information about: diet, exercise, weight management, prevention of cardiac or  vascular disease, the relationship between weight and GERD, fall prevention, designing advance directives and supplements and preventive services including:   · Annual Wellness Visit (AWV)  · Depression Screening (15 minutes face to face, Code )  · Influenza Vaccine and Administration  · Pneumococcal Vaccine and Administration.      Assessment/Plan      Medications        Problem List         LOS    Physical.  Doing well, vaccines current.  Coated baby aspirin daily.  Discussed health maintenance, screening test, lifestyle modification.  Followed by dermatology for yearly skin exams.  Hypertension.    Improved today.  Discussed low-sodium diet.  Follow-up recheck  Hyperlipidemia.    Much improved today back on rosuvastatin 5 mg daily.  Discussed diet, exercise, lifestyle modification.  Aggressive LDL target considering CAD.  Positive mild elevation LFTs, benefit statin outweighs risk.  Fatty liver.  Mild elevation LFTs.  Continue fish oil.  Benefit statin outweighs risk. Drinking 6 beers per day/Discontinue/moderate.  Follow-up recheck.  Coronary artery disease.  History of complete obstruction LAD with collaterals.  Continue risk factor reduction.  Followed by cardiology Dr. Odom, has had repeat stress testing March/2020..  Cellulitis right lower extremity.  Complicating hematoma.  Improved/resolved, wound healed, status post course wound care.  Neck pain.  Likely secondary to OA.  Ice, rehabilitation exercises discussed.  DDX includes cervical disc disease, consider imaging if persistent symptoms.  Carotid stenosis.  Mild per ultrasound 2018, repeat sched. continue risk factor reduction.  Screening for colon cancer.  Colonoscopy benign 2018.  Alcohol abuse.  Improved, has cut back to 2 beers per day currently.  Encourage cessation.  Colon screening.  Colonoscopy benign 2018.  COVID-19 viral infection.  Clinically improved/symptoms resolved.  Has completed quarantine.    Diagnoses and all orders for this  visit:    1. Medicare annual wellness visit, subsequent (Primary)  -     POCT urinalysis dipstick, manual    2. HTN (hypertension), benign  -     CBC & Differential  -     Comprehensive Metabolic Panel  -     TSH    3. Mixed hyperlipidemia  -     Lipid Panel With / Chol / HDL Ratio    4. Colon cancer screening    5. Screening for prostate cancer    6. Class 1 obesity due to excess calories with serious comorbidity and body mass index (BMI) of 30.0 to 30.9 in adult    7. History of tobacco use    8. Carotid bruit, unspecified laterality  -     US Carotid Bilateral; Future    9. Coronary artery disease involving native heart with angina pectoris, unspecified vessel or lesion type (CMS/[facility])              Expected course, medications, and adverse effects discussed.  Call or return if worsening or persistent symptoms.  I wore protective equipment throughout this patient encounter including a mask, gloves, and eye protection.  Hand hygiene was performed before donning protective equipment and after removal when leaving the room. The complete contents of the Assessment and Plan and Data / Lab Results as documented above have been reviewed and addressed by myself with the patient today as part of an ongoing evaluation / treatment plan.  If some of the documentation has been copied from a previous note and is unchanged it indicates that this problem / plan has been assessed today but is stable from a previous visit and no changes have been recommended.

## 2021-07-20 ENCOUNTER — OFFICE VISIT (OUTPATIENT)
Dept: FAMILY MEDICINE CLINIC | Facility: CLINIC | Age: 76
End: 2021-07-20

## 2021-07-20 VITALS
HEIGHT: 71 IN | HEART RATE: 68 BPM | BODY MASS INDEX: 31.08 KG/M2 | DIASTOLIC BLOOD PRESSURE: 68 MMHG | WEIGHT: 222 LBS | TEMPERATURE: 97.3 F | RESPIRATION RATE: 18 BRPM | OXYGEN SATURATION: 97 % | SYSTOLIC BLOOD PRESSURE: 120 MMHG

## 2021-07-20 DIAGNOSIS — I25.119 CORONARY ARTERY DISEASE INVOLVING NATIVE HEART WITH ANGINA PECTORIS, UNSPECIFIED VESSEL OR LESION TYPE (HCC): ICD-10-CM

## 2021-07-20 DIAGNOSIS — Z12.11 COLON CANCER SCREENING: ICD-10-CM

## 2021-07-20 DIAGNOSIS — I10 HTN (HYPERTENSION), BENIGN: ICD-10-CM

## 2021-07-20 DIAGNOSIS — E78.2 MIXED HYPERLIPIDEMIA: ICD-10-CM

## 2021-07-20 DIAGNOSIS — Z00.00 MEDICARE ANNUAL WELLNESS VISIT, SUBSEQUENT: Primary | ICD-10-CM

## 2021-07-20 DIAGNOSIS — Z12.5 SCREENING FOR PROSTATE CANCER: ICD-10-CM

## 2021-07-20 DIAGNOSIS — Z87.891 HISTORY OF TOBACCO USE: ICD-10-CM

## 2021-07-20 DIAGNOSIS — R09.89 CAROTID BRUIT, UNSPECIFIED LATERALITY: ICD-10-CM

## 2021-07-20 DIAGNOSIS — E66.09 CLASS 1 OBESITY DUE TO EXCESS CALORIES WITH SERIOUS COMORBIDITY AND BODY MASS INDEX (BMI) OF 30.0 TO 30.9 IN ADULT: ICD-10-CM

## 2021-07-20 PROBLEM — U07.1 COVID-19: Status: RESOLVED | Noted: 2020-09-18 | Resolved: 2021-07-20

## 2021-07-20 LAB
BILIRUB BLD-MCNC: NEGATIVE MG/DL
CLARITY, POC: CLEAR
COLOR UR: YELLOW
GLUCOSE UR STRIP-MCNC: NEGATIVE MG/DL
KETONES UR QL: NEGATIVE
LEUKOCYTE EST, POC: NEGATIVE
NITRITE UR-MCNC: NEGATIVE MG/ML
PH UR: 5 [PH] (ref 5–8)
PROT UR STRIP-MCNC: NEGATIVE MG/DL
RBC # UR STRIP: NEGATIVE /UL
SP GR UR: 1.01 (ref 1–1.03)
UROBILINOGEN UR QL: NORMAL

## 2021-07-20 PROCEDURE — 3725F SCREEN DEPRESSION PERFORMED: CPT | Performed by: FAMILY MEDICINE

## 2021-07-20 PROCEDURE — 1170F FXNL STATUS ASSESSED: CPT | Performed by: FAMILY MEDICINE

## 2021-07-20 PROCEDURE — 81002 URINALYSIS NONAUTO W/O SCOPE: CPT | Performed by: FAMILY MEDICINE

## 2021-07-20 PROCEDURE — 99214 OFFICE O/P EST MOD 30 MIN: CPT | Performed by: FAMILY MEDICINE

## 2021-07-20 PROCEDURE — 1159F MED LIST DOCD IN RCRD: CPT | Performed by: FAMILY MEDICINE

## 2021-07-20 PROCEDURE — 99497 ADVNCD CARE PLAN 30 MIN: CPT | Performed by: FAMILY MEDICINE

## 2021-07-20 PROCEDURE — G0439 PPPS, SUBSEQ VISIT: HCPCS | Performed by: FAMILY MEDICINE

## 2021-07-20 PROCEDURE — 96160 PT-FOCUSED HLTH RISK ASSMT: CPT | Performed by: FAMILY MEDICINE

## 2021-07-21 ENCOUNTER — TELEPHONE (OUTPATIENT)
Dept: FAMILY MEDICINE CLINIC | Facility: CLINIC | Age: 76
End: 2021-07-21

## 2021-07-21 LAB
ALBUMIN SERPL-MCNC: 4.3 G/DL (ref 3.7–4.7)
ALBUMIN/GLOB SERPL: 1.7 {RATIO} (ref 1.2–2.2)
ALP SERPL-CCNC: 117 IU/L (ref 48–121)
ALT SERPL-CCNC: 44 IU/L (ref 0–44)
AST SERPL-CCNC: 30 IU/L (ref 0–40)
BASOPHILS # BLD AUTO: 0.1 X10E3/UL (ref 0–0.2)
BASOPHILS NFR BLD AUTO: 2 %
BILIRUB SERPL-MCNC: 0.9 MG/DL (ref 0–1.2)
BUN SERPL-MCNC: 11 MG/DL (ref 8–27)
BUN/CREAT SERPL: 12 (ref 10–24)
CALCIUM SERPL-MCNC: 9.4 MG/DL (ref 8.6–10.2)
CHLORIDE SERPL-SCNC: 102 MMOL/L (ref 96–106)
CHOLEST SERPL-MCNC: 153 MG/DL (ref 100–199)
CHOLEST/HDLC SERPL: 2.8 RATIO (ref 0–5)
CO2 SERPL-SCNC: 22 MMOL/L (ref 20–29)
CREAT SERPL-MCNC: 0.95 MG/DL (ref 0.76–1.27)
EOSINOPHIL # BLD AUTO: 0.1 X10E3/UL (ref 0–0.4)
EOSINOPHIL NFR BLD AUTO: 2 %
ERYTHROCYTE [DISTWIDTH] IN BLOOD BY AUTOMATED COUNT: 12.9 % (ref 11.6–15.4)
GLOBULIN SER CALC-MCNC: 2.6 G/DL (ref 1.5–4.5)
GLUCOSE SERPL-MCNC: 119 MG/DL (ref 65–99)
HCT VFR BLD AUTO: 48.2 % (ref 37.5–51)
HDLC SERPL-MCNC: 54 MG/DL
HGB BLD-MCNC: 16.2 G/DL (ref 13–17.7)
IMM GRANULOCYTES # BLD AUTO: 0 X10E3/UL (ref 0–0.1)
IMM GRANULOCYTES NFR BLD AUTO: 0 %
LDLC SERPL CALC-MCNC: 81 MG/DL (ref 0–99)
LYMPHOCYTES # BLD AUTO: 1.3 X10E3/UL (ref 0.7–3.1)
LYMPHOCYTES NFR BLD AUTO: 20 %
MCH RBC QN AUTO: 31.5 PG (ref 26.6–33)
MCHC RBC AUTO-ENTMCNC: 33.6 G/DL (ref 31.5–35.7)
MCV RBC AUTO: 94 FL (ref 79–97)
MONOCYTES # BLD AUTO: 0.4 X10E3/UL (ref 0.1–0.9)
MONOCYTES NFR BLD AUTO: 6 %
NEUTROPHILS # BLD AUTO: 4.6 X10E3/UL (ref 1.4–7)
NEUTROPHILS NFR BLD AUTO: 70 %
PLATELET # BLD AUTO: 275 X10E3/UL (ref 150–450)
POTASSIUM SERPL-SCNC: 4.5 MMOL/L (ref 3.5–5.2)
PROT SERPL-MCNC: 6.9 G/DL (ref 6–8.5)
RBC # BLD AUTO: 5.15 X10E6/UL (ref 4.14–5.8)
SODIUM SERPL-SCNC: 138 MMOL/L (ref 134–144)
TRIGL SERPL-MCNC: 95 MG/DL (ref 0–149)
TSH SERPL DL<=0.005 MIU/L-ACNC: 3.68 UIU/ML (ref 0.45–4.5)
VLDLC SERPL CALC-MCNC: 18 MG/DL (ref 5–40)
WBC # BLD AUTO: 6.6 X10E3/UL (ref 3.4–10.8)

## 2021-07-21 NOTE — TELEPHONE ENCOUNTER
----- Message from Jonathan Johnson MD sent at 7/21/2021  8:42 AM EDT -----  Let him know his blood work overall looks good cholesterol, kidney function, liver function are normal, he is blood sugar is a little worse to 119, keep working on diet and exercise, will recheck labs in a year, thanks

## 2021-07-26 ENCOUNTER — HOSPITAL ENCOUNTER (OUTPATIENT)
Dept: ULTRASOUND IMAGING | Facility: HOSPITAL | Age: 76
Discharge: HOME | End: 2021-07-26
Admitting: FAMILY MEDICINE

## 2021-07-26 DIAGNOSIS — R09.89 CAROTID BRUIT, UNSPECIFIED LATERALITY: ICD-10-CM

## 2021-07-26 PROCEDURE — 93880 EXTRACRANIAL BILAT STUDY: CPT

## 2021-12-07 ENCOUNTER — TELEPHONE (OUTPATIENT)
Dept: FAMILY MEDICINE CLINIC | Facility: CLINIC | Age: 76
End: 2021-12-07

## 2022-01-09 DIAGNOSIS — E78.2 MIXED HYPERLIPIDEMIA: ICD-10-CM

## 2022-01-13 RX ORDER — ROSUVASTATIN CALCIUM 5 MG/1
TABLET, COATED ORAL
Qty: 90 TABLET | Refills: 1 | Status: SHIPPED | OUTPATIENT
Start: 2022-01-13 | End: 2022-06-10

## 2022-01-20 NOTE — PROGRESS NOTES
Subjective   Anthony Shelley is a 76 y.o. male.     Chief Complaint   Patient presents with   • Hypertension   • Ear Problem       Hypertension  This is a chronic problem. The current episode started more than 1 year ago. The problem has been gradually improving since onset. The problem is controlled. Pertinent negatives include no anxiety, blurred vision, chest pain, headaches, malaise/fatigue, neck pain, orthopnea, palpitations, peripheral edema, PND, shortness of breath or sweats. There are no associated agents to hypertension. Risk factors for coronary artery disease include dyslipidemia, male gender and obesity. Current antihypertension treatment includes angiotensin blockers, calcium channel blockers and beta blockers. The current treatment provides mild improvement. Hypertensive end-organ damage includes CAD/MI.   Earache   There is pain in the right ear. This is a new problem. The current episode started more than 1 month ago. The problem occurs constantly. The problem has been unchanged. There has been no fever. The patient is experiencing no pain. Pertinent negatives include no abdominal pain, coughing, diarrhea, ear discharge, headaches, hearing loss, neck pain, rash, rhinorrhea, sore throat or vomiting. Treatments tried: peroxide and warm water, baby oil. The treatment provided no relief.            I personally reviewed and updated the patient's allergies, medications, problem list, and past medical, surgical, social, and family history. I have reviewed and confirmed the accuracy of the History of Present Illness and Review of Symptoms as documented by the MA/ONELIA/RN. Jonathan Johnson MD    Family History   Problem Relation Age of Onset   • Stroke Father    • Cancer Brother         Lymph node   • No Known Problems Mother    • No Known Problems Sister        Social History     Tobacco Use   • Smoking status: Former Smoker     Start date:      Quit date: 2000     Years since quittin.0   •  Smokeless tobacco: Never Used   Vaping Use   • Vaping Use: Never used   Substance Use Topics   • Alcohol use: Yes     Comment: occasional   • Drug use: No       Past Surgical History:   Procedure Laterality Date   • CARDIAC CATHETERIZATION  12/12/2018   • CHOLECYSTECTOMY  1996   • SKIN BIOPSY  01/20/2022       Patient Active Problem List   Diagnosis   • Coronary artery disease involving native heart   • HTN (hypertension), benign   • Mixed hyperlipidemia   • Lumbar disc disease   • Class 1 obesity due to excess calories with serious comorbidity and body mass index (BMI) of 30.0 to 30.9 in adult   • Colon cancer screening   • Medicare annual wellness visit, subsequent   • Screening for prostate cancer   • Callus of foot   • Elevated LFTs   • Cellulitis of right lower extremity   • History of tobacco use         Current Outpatient Medications:   •  amLODIPine (Norvasc) 5 MG tablet, Norvasc 5 mg tablet  Take 1 tablet every day by oral route at bedtime for 90 days., Disp: , Rfl:   •  aspirin 81 MG EC tablet, Take 1 tablet by mouth Daily., Disp: , Rfl:   •  irbesartan (AVAPRO) 300 MG tablet, Take 300 mg by mouth Daily., Disp: , Rfl:   •  metoprolol succinate XL (TOPROL-XL) 50 MG 24 hr tablet, TAKE 1 TABLET EVERY DAY, Disp: 90 tablet, Rfl: 1  •  Multiple Vitamins-Minerals (CENTRUM ADULTS) tablet, Take 1 tablet by mouth Daily., Disp: , Rfl:   •  rosuvastatin (CRESTOR) 5 MG tablet, TAKE 1 TABLET EVERY DAY, Disp: 90 tablet, Rfl: 1  •  neomycin-polymyxin-hydrocortisone (CORTISPORIN) 3.5-18241-0 otic solution, Administer 4 drops into both ears 4 (Four) Times a Day., Disp: 10 mL, Rfl: 0         Review of Systems   Constitutional: Negative for chills, diaphoresis and malaise/fatigue.   HENT: Positive for ear pain. Negative for ear discharge, hearing loss, rhinorrhea and sore throat.    Eyes: Negative for blurred vision and visual disturbance.   Respiratory: Negative for cough and shortness of breath.    Cardiovascular: Negative  "for chest pain, palpitations, orthopnea and PND.   Gastrointestinal: Negative for abdominal pain, diarrhea, nausea and vomiting.   Endocrine: Negative for polydipsia and polyphagia.   Musculoskeletal: Negative for neck pain and neck stiffness.   Skin: Negative for color change, pallor and rash.   Neurological: Negative for seizures and syncope.   Hematological: Negative for adenopathy.   Psychiatric/Behavioral: Negative for hallucinations and suicidal ideas.       I have reviewed and confirmed the accuracy of the ROS as documented by the MA/LPN/RN Jonathan Johnson MD      Objective   /70 (BP Location: Right arm, Patient Position: Sitting, Cuff Size: Adult)   Pulse 75   Temp 98 °F (36.7 °C) (Temporal)   Resp 8   Ht 177.8 cm (70\")   Wt 104 kg (230 lb)   SpO2 98% Comment: room air  BMI 33.00 kg/m²   BP Readings from Last 3 Encounters:   01/21/22 130/70   07/20/21 120/68   05/19/21 126/80     Wt Readings from Last 3 Encounters:   01/21/22 104 kg (230 lb)   07/20/21 101 kg (222 lb)   05/19/21 102 kg (224 lb 6.4 oz)     Physical Exam  Constitutional:       Appearance: Normal appearance. He is well-developed. He is not diaphoretic.   HENT:      Right Ear: Hearing, tympanic membrane, ear canal and external ear normal.      Left Ear: Hearing, tympanic membrane, ear canal and external ear normal.      Nose: Nose normal. No mucosal edema or congestion.      Right Sinus: No maxillary sinus tenderness or frontal sinus tenderness.      Left Sinus: No maxillary sinus tenderness or frontal sinus tenderness.      Mouth/Throat:      Mouth: No oral lesions.      Pharynx: Uvula midline. No oropharyngeal exudate or posterior oropharyngeal erythema.      Tonsils: No tonsillar exudate.   Cardiovascular:      Rate and Rhythm: Normal rate and regular rhythm.      Pulses: Normal pulses.      Heart sounds: Normal heart sounds, S1 normal and S2 normal. No murmur heard.  No friction rub. No gallop.    Pulmonary:      Effort: " Pulmonary effort is normal. No accessory muscle usage.      Breath sounds: Normal breath sounds. No stridor. No decreased breath sounds, wheezing, rhonchi or rales.   Abdominal:      General: Bowel sounds are normal. There is no distension.      Palpations: Abdomen is soft. Abdomen is not rigid. There is no mass or pulsatile mass.      Tenderness: There is no abdominal tenderness. There is no guarding or rebound. Negative signs include Thomas's sign.      Hernia: No hernia is present.   Skin:     General: Skin is warm and dry.      Coloration: Skin is not pale.   Neurological:      Mental Status: He is alert and oriented to person, place, and time.      Coordination: Coordination normal.      Gait: Gait normal.         Data / Lab Results:    No results found for: HGBA1C     Lab Results   Component Value Date    LDL 81 07/20/2021    LDL 67 07/07/2020     (H) 01/08/2020     Lab Results   Component Value Date    CHOL 232 (H) 04/16/2018     Lab Results   Component Value Date    TRIG 95 07/20/2021    TRIG 104 07/07/2020    TRIG 156 (H) 01/08/2020     Lab Results   Component Value Date    HDL 54 07/20/2021    HDL 67 07/07/2020    HDL 58 01/08/2020     Lab Results   Component Value Date    PSA 1.5 04/16/2018     Lab Results   Component Value Date    WBC 6.6 07/20/2021    HGB 16.2 07/20/2021    HCT 48.2 07/20/2021    MCV 94 07/20/2021     07/20/2021     Lab Results   Component Value Date    TSH 3.680 07/20/2021      Lab Results   Component Value Date    GLUCOSE 119 (H) 07/20/2021    BUN 11 07/20/2021    CREATININE 0.95 07/20/2021    EGFRIFNONA 77 07/20/2021    EGFRIFAFRI 90 07/20/2021    BCR 12 07/20/2021    K 4.5 07/20/2021    CO2 22 07/20/2021    CALCIUM 9.4 07/20/2021    PROTENTOTREF 6.9 07/20/2021    ALBUMIN 4.3 07/20/2021    LABIL2 1.7 07/20/2021    AST 30 07/20/2021    ALT 44 07/20/2021     No results found for: BELKIS, RF, SEDRATE   No results found for: CRP   Lab Results   Component Value Date    TIBC 305  07/24/2020    FERRITIN 551 (H) 07/24/2020      No results found for: [id number]       Assessment/Plan      Medications        Problem List         LOS  Health maintenance.  Doing well, vaccines current.  Coated baby aspirin daily.  Discussed health maintenance, screening test, lifestyle modification.  Followed by dermatology for yearly skin exams.  Hypertension.    Improved today.  Discussed low-sodium diet.  Follow-up recheck  Hyperlipidemia.    Much improved today back on rosuvastatin 5 mg daily.  Discussed diet, exercise, lifestyle modification.  Aggressive LDL target considering CAD.  Positive mild elevation LFTs, benefit statin outweighs risk.  Fatty liver.  Mild elevation LFTs.  Continue fish oil.  Benefit statin outweighs risk. Drinking 6 beers per day/Discontinue/moderate.  Follow-up recheck.  Coronary artery disease.  History of complete obstruction LAD with collaterals.  Continue risk factor reduction.  Followed by cardiology Dr. Odom, has had repeat stress testing March/2020..  Cellulitis right lower extremity.  Complicating hematoma.  Improved/resolved, wound healed, status post course wound care.  Neck pain.  Likely secondary to OA.  Ice, rehabilitation exercises discussed.  DDX includes cervical disc disease, consider imaging if persistent symptoms.  Carotid stenosis.  Mild per ultrasound 2018, 2021. continue risk factor reduction.  Screening for colon cancer.  Colonoscopy benign 2018.  Alcohol abuse.  Improved, has cut back to 2 beers per day currently.  Encourage cessation.  Colon screening.  Colonoscopy benign 2018.  COVID-19 viral infection.  Clinically improved/symptoms resolved.  Has completed quarantine.  External otitis.  Cerumen impaction cleared with irrigation today.  Start drops.  Call or return if worsening symptoms.    Diagnoses and all orders for this visit:    1. HTN (hypertension), benign (Primary)    2. Problem of right ear    3. Class 1 obesity due to excess calories with serious  comorbidity and body mass index (BMI) of 30.0 to 30.9 in adult    4. History of tobacco use    5. Need for influenza vaccination  -     Fluzone High-Dose 65+yrs ([id number])    6. Ear pain, bilateral  -     neomycin-polymyxin-hydrocortisone (CORTISPORIN) 3.5-02227-4 otic solution; Administer 4 drops into both ears 4 (Four) Times a Day.  Dispense: 10 mL; Refill: 0    7. Elevated LFTs    8. Coronary artery disease involving native heart with angina pectoris, unspecified vessel or lesion type (HCC)              Expected course, medications, and adverse effects discussed.  Call or return if worsening or persistent symptoms.  I wore protective equipment throughout this patient encounter including a mask, gloves, and eye protection.  Hand hygiene was performed before donning protective equipment and after removal when leaving the room. The complete contents of the Assessment and Plan and Data/Lab Results as documented above have been reviewed and addressed by myself with the patient today as part of an ongoing evaluation / treatment plan.  If some of the documentation has been copied from a previous note and is unchanged it indicates that this problem / plan has been assessed today but is stable from a previous visit and no changes have been recommended.

## 2022-01-21 ENCOUNTER — OFFICE VISIT (OUTPATIENT)
Dept: FAMILY MEDICINE CLINIC | Facility: CLINIC | Age: 77
End: 2022-01-21

## 2022-01-21 VITALS
DIASTOLIC BLOOD PRESSURE: 70 MMHG | HEART RATE: 75 BPM | WEIGHT: 230 LBS | HEIGHT: 70 IN | RESPIRATION RATE: 8 BRPM | OXYGEN SATURATION: 98 % | TEMPERATURE: 98 F | SYSTOLIC BLOOD PRESSURE: 130 MMHG | BODY MASS INDEX: 32.93 KG/M2

## 2022-01-21 DIAGNOSIS — H93.91 PROBLEM OF RIGHT EAR: ICD-10-CM

## 2022-01-21 DIAGNOSIS — Z87.891 HISTORY OF TOBACCO USE: ICD-10-CM

## 2022-01-21 DIAGNOSIS — Z23 NEED FOR INFLUENZA VACCINATION: ICD-10-CM

## 2022-01-21 DIAGNOSIS — I10 HTN (HYPERTENSION), BENIGN: Primary | ICD-10-CM

## 2022-01-21 DIAGNOSIS — R79.89 ELEVATED LFTS: ICD-10-CM

## 2022-01-21 DIAGNOSIS — I25.119 CORONARY ARTERY DISEASE INVOLVING NATIVE HEART WITH ANGINA PECTORIS, UNSPECIFIED VESSEL OR LESION TYPE: ICD-10-CM

## 2022-01-21 DIAGNOSIS — E66.09 CLASS 1 OBESITY DUE TO EXCESS CALORIES WITH SERIOUS COMORBIDITY AND BODY MASS INDEX (BMI) OF 30.0 TO 30.9 IN ADULT: ICD-10-CM

## 2022-01-21 DIAGNOSIS — H92.03 EAR PAIN, BILATERAL: ICD-10-CM

## 2022-01-21 PROCEDURE — 90662 IIV NO PRSV INCREASED AG IM: CPT | Performed by: FAMILY MEDICINE

## 2022-01-21 PROCEDURE — G0008 ADMIN INFLUENZA VIRUS VAC: HCPCS | Performed by: FAMILY MEDICINE

## 2022-01-21 PROCEDURE — 99214 OFFICE O/P EST MOD 30 MIN: CPT | Performed by: FAMILY MEDICINE

## 2022-06-09 DIAGNOSIS — E78.2 MIXED HYPERLIPIDEMIA: ICD-10-CM

## 2022-06-10 RX ORDER — ROSUVASTATIN CALCIUM 5 MG/1
TABLET, COATED ORAL
Qty: 90 TABLET | Refills: 1 | Status: SHIPPED | OUTPATIENT
Start: 2022-06-10

## 2022-08-05 ENCOUNTER — OFFICE VISIT (OUTPATIENT)
Dept: FAMILY MEDICINE CLINIC | Facility: CLINIC | Age: 77
End: 2022-08-05

## 2022-08-05 ENCOUNTER — HOSPITAL ENCOUNTER (OUTPATIENT)
Dept: GENERAL RADIOLOGY | Facility: HOSPITAL | Age: 77
Discharge: HOME | End: 2022-08-05
Admitting: FAMILY MEDICINE

## 2022-08-05 VITALS
WEIGHT: 226.4 LBS | RESPIRATION RATE: 18 BRPM | HEIGHT: 70 IN | TEMPERATURE: 98.1 F | SYSTOLIC BLOOD PRESSURE: 136 MMHG | BODY MASS INDEX: 32.41 KG/M2 | HEART RATE: 74 BPM | DIASTOLIC BLOOD PRESSURE: 64 MMHG | OXYGEN SATURATION: 99 %

## 2022-08-05 DIAGNOSIS — E78.2 MIXED HYPERLIPIDEMIA: ICD-10-CM

## 2022-08-05 DIAGNOSIS — E66.09 CLASS 1 OBESITY DUE TO EXCESS CALORIES WITH SERIOUS COMORBIDITY AND BODY MASS INDEX (BMI) OF 32.0 TO 32.9 IN ADULT: ICD-10-CM

## 2022-08-05 DIAGNOSIS — R05.9 COUGH: Primary | ICD-10-CM

## 2022-08-05 DIAGNOSIS — Z12.5 SCREENING FOR PROSTATE CANCER: ICD-10-CM

## 2022-08-05 DIAGNOSIS — Z00.00 MEDICARE ANNUAL WELLNESS VISIT, SUBSEQUENT: ICD-10-CM

## 2022-08-05 DIAGNOSIS — I10 HTN (HYPERTENSION), BENIGN: ICD-10-CM

## 2022-08-05 DIAGNOSIS — I25.119 CORONARY ARTERY DISEASE INVOLVING NATIVE HEART WITH ANGINA PECTORIS, UNSPECIFIED VESSEL OR LESION TYPE: ICD-10-CM

## 2022-08-05 DIAGNOSIS — R05.9 COUGH: ICD-10-CM

## 2022-08-05 DIAGNOSIS — Z87.891 HISTORY OF TOBACCO USE: ICD-10-CM

## 2022-08-05 DIAGNOSIS — Z12.11 COLON CANCER SCREENING: ICD-10-CM

## 2022-08-05 PROCEDURE — 71046 X-RAY EXAM CHEST 2 VIEWS: CPT

## 2022-08-05 PROCEDURE — 1159F MED LIST DOCD IN RCRD: CPT | Performed by: FAMILY MEDICINE

## 2022-08-05 PROCEDURE — 99214 OFFICE O/P EST MOD 30 MIN: CPT | Performed by: FAMILY MEDICINE

## 2022-08-05 PROCEDURE — 96160 PT-FOCUSED HLTH RISK ASSMT: CPT | Performed by: FAMILY MEDICINE

## 2022-08-05 PROCEDURE — 3725F SCREEN DEPRESSION PERFORMED: CPT | Performed by: FAMILY MEDICINE

## 2022-08-05 PROCEDURE — G0439 PPPS, SUBSEQ VISIT: HCPCS | Performed by: FAMILY MEDICINE

## 2022-08-05 PROCEDURE — 99497 ADVNCD CARE PLAN 30 MIN: CPT | Performed by: FAMILY MEDICINE

## 2022-08-05 NOTE — ASSESSMENT & PLAN NOTE
Patient's (Body mass index is 32.49 kg/m².) indicates that they are obese (BMI >30) with health conditions that include hypertension, coronary heart disease and dyslipidemias . Weight is unchanged. BMI is is above average; BMI management plan is completed. We discussed portion control and increasing exercise.   
agree

## 2022-09-22 ENCOUNTER — HOSPITAL ENCOUNTER (OUTPATIENT)
Dept: ULTRASOUND IMAGING | Facility: HOSPITAL | Age: 77
Discharge: HOME | End: 2022-09-22

## 2022-09-22 ENCOUNTER — HOSPITAL ENCOUNTER (OUTPATIENT)
Dept: CARDIOLOGY | Facility: HOSPITAL | Age: 77
Discharge: HOME | End: 2022-09-22

## 2022-09-22 VITALS
HEIGHT: 70 IN | BODY MASS INDEX: 32.51 KG/M2 | WEIGHT: 227.07 LBS | SYSTOLIC BLOOD PRESSURE: 141 MMHG | DIASTOLIC BLOOD PRESSURE: 73 MMHG

## 2022-09-22 DIAGNOSIS — R09.89 BRUIT: ICD-10-CM

## 2022-09-22 DIAGNOSIS — R06.02 SHORTNESS OF BREATH: ICD-10-CM

## 2022-09-22 LAB
BH CV ECHO MEAS - ACS: 1.81 CM
BH CV ECHO MEAS - AI P1/2T: 653.2 MSEC
BH CV ECHO MEAS - AO MAX PG: 5 MMHG
BH CV ECHO MEAS - AO ROOT DIAM: 3.2 CM
BH CV ECHO MEAS - AO V2 MAX: 111.5 CM/SEC
BH CV ECHO MEAS - EDV(CUBED): 170.3 ML
BH CV ECHO MEAS - EDV(MOD-SP4): 85.1 ML
BH CV ECHO MEAS - EF(MOD-BP): 45 %
BH CV ECHO MEAS - EF(MOD-SP4): 51.1 %
BH CV ECHO MEAS - ESV(CUBED): 87.5 ML
BH CV ECHO MEAS - ESV(MOD-SP4): 41.6 ML
BH CV ECHO MEAS - FS: 19.9 %
BH CV ECHO MEAS - IVS/LVPW: 1.02 CM
BH CV ECHO MEAS - IVSD: 1.08 CM
BH CV ECHO MEAS - LA DIMENSION: 4.3 CM
BH CV ECHO MEAS - LV DIASTOLIC VOL/BSA (35-75): 38.7 CM2
BH CV ECHO MEAS - LV MASS(C)D: 236.7 GRAMS
BH CV ECHO MEAS - LV MAX PG: 3.2 MMHG
BH CV ECHO MEAS - LV SYSTOLIC VOL/BSA (12-30): 18.9 CM2
BH CV ECHO MEAS - LV V1 MAX: 89 CM/SEC
BH CV ECHO MEAS - LVIDD: 5.5 CM
BH CV ECHO MEAS - LVIDS: 4.4 CM
BH CV ECHO MEAS - LVOT AREA: 3.1 CM2
BH CV ECHO MEAS - LVOT DIAM: 1.98 CM
BH CV ECHO MEAS - LVPWD: 1.06 CM
BH CV ECHO MEAS - MR MAX PG: 107.3 MMHG
BH CV ECHO MEAS - MR MAX VEL: 518 CM/SEC
BH CV ECHO MEAS - MV A MAX VEL: 81.8 CM/SEC
BH CV ECHO MEAS - MV DEC SLOPE: 462.5 CM/SEC2
BH CV ECHO MEAS - MV DEC TIME: 0.15 MSEC
BH CV ECHO MEAS - MV E MAX VEL: 67.2 CM/SEC
BH CV ECHO MEAS - MV E/A: 0.82
BH CV ECHO MEAS - MV MAX PG: 3.6 MMHG
BH CV ECHO MEAS - MV MEAN PG: 1.39 MMHG
BH CV ECHO MEAS - MV V2 VTI: 26.6 CM
BH CV ECHO MEAS - PA ACC TIME: 0.1 SEC
BH CV ECHO MEAS - PA PR(ACCEL): 35.3 MMHG
BH CV ECHO MEAS - PA V2 MAX: 147.4 CM/SEC
BH CV ECHO MEAS - PI END-D VEL: 57.9 CM/SEC
BH CV ECHO MEAS - PULM A REVS DUR: 0.13 SEC
BH CV ECHO MEAS - PULM A REVS VEL: 30.6 CM/SEC
BH CV ECHO MEAS - PULM DIAS VEL: 43.3 CM/SEC
BH CV ECHO MEAS - PULM S/D: 1.27
BH CV ECHO MEAS - PULM SYS VEL: 55 CM/SEC
BH CV ECHO MEAS - RAP SYSTOLE: 10 MMHG
BH CV ECHO MEAS - RVSP: 41 MMHG
BH CV ECHO MEAS - SI(MOD-SP4): 19.8 ML/M2
BH CV ECHO MEAS - SV(MOD-SP4): 43.5 ML
BH CV ECHO MEAS - TAPSE (>1.6): 2.1 CM
BH CV ECHO MEAS - TR MAX PG: 31 MMHG
BH CV ECHO MEAS - TR MAX VEL: 277.5 CM/SEC
BH CV XLRA - RV BASE: 4.2 CM
BH CV XLRA - RV MID: 2.4 CM
LV EF 2D ECHO EST: 45 %
MAXIMAL PREDICTED HEART RATE: 143 BPM
STRESS TARGET HR: 122 BPM

## 2022-09-22 PROCEDURE — 93306 TTE W/DOPPLER COMPLETE: CPT

## 2022-09-22 PROCEDURE — 93880 EXTRACRANIAL BILAT STUDY: CPT

## 2022-09-22 PROCEDURE — 93306 TTE W/DOPPLER COMPLETE: CPT | Performed by: INTERNAL MEDICINE

## 2023-01-03 NOTE — PROGRESS NOTES
Subjective   Anthony Shelley is a 77 y.o. male.     Chief Complaint   Patient presents with   • Hypertension   • Hyperlipidemia   • Coronary Artery Disease       History of Present Illness  Anthony saw Dr. Soliz in Nov 2022, and he suggested doing a heart cath for 2 different valves. Anthony has been trying to set up an appointment to have this completed.   Coronary Artery Disease  Presents for follow-up visit. Pertinent negatives include no chest pain, chest pressure, chest tightness, dizziness, leg swelling, muscle weakness, palpitations, shortness of breath or weight gain. Risk factors include hyperlipidemia and obesity. Compliance with diet is good. Compliance with exercise is good. Compliance with medications is good.   Hypertension  This is a chronic problem. The current episode started more than 1 year ago. The problem has been gradually improving since onset. The problem is controlled. Associated symptoms include sweats. Pertinent negatives include no anxiety, blurred vision, chest pain, headaches, malaise/fatigue, neck pain, orthopnea, palpitations, peripheral edema, PND or shortness of breath. (He does currently take his blood pressure once daily, and averaging 120s/70s) There are no associated agents to hypertension. Risk factors for coronary artery disease include dyslipidemia, male gender and obesity. Current antihypertension treatment includes angiotensin blockers, calcium channel blockers and beta blockers. The current treatment provides mild improvement. Hypertensive end-organ damage includes CAD/MI.   Hyperlipidemia  This is a chronic problem. The current episode started more than 1 year ago. The problem is controlled. Recent lipid tests were reviewed and are normal. Exacerbating diseases include obesity. There are no known factors aggravating his hyperlipidemia. Pertinent negatives include no chest pain, leg pain or shortness of breath. Current antihyperlipidemic treatment includes diet change.  There are no compliance problems.  Risk factors for coronary artery disease include hypertension, male sex and obesity.            I personally reviewed and updated the patient's allergies, medications, problem list, and past medical, surgical, social, and family history. I have reviewed and confirmed the accuracy of the History of Present Illness and Review of Symptoms as documented by the MA/LPN/RN. Jonathan Johnson MD    Family History   Problem Relation Age of Onset   • Stroke Father    • Cancer Brother         Lymph node   • No Known Problems Mother    • No Known Problems Sister        Social History     Tobacco Use   • Smoking status: Former     Packs/day: 1.00     Years: 32.00     Pack years: 32.00     Types: Cigarettes     Start date: 1961     Quit date: 2000     Years since quittin.0   • Smokeless tobacco: Never   Vaping Use   • Vaping Use: Never used   Substance Use Topics   • Alcohol use: Yes     Comment: occasional   • Drug use: No       Past Surgical History:   Procedure Laterality Date   • CARDIAC CATHETERIZATION  2018   • CHOLECYSTECTOMY     • SKIN BIOPSY  2022       Patient Active Problem List   Diagnosis   • Coronary artery disease involving native heart   • HTN (hypertension), benign   • Mixed hyperlipidemia   • Lumbar disc disease   • Class 1 obesity due to excess calories with serious comorbidity and body mass index (BMI) of 32.0 to 32.9 in adult   • Colon cancer screening   • Medicare annual wellness visit, subsequent   • Screening for prostate cancer   • Callus of foot   • Elevated LFTs   • Cellulitis of right lower extremity   • History of tobacco use         Current Outpatient Medications:   •  amLODIPine (NORVASC) 5 MG tablet, Norvasc 5 mg tablet  Take 1 tablet every day by oral route at bedtime for 90 days., Disp: , Rfl:   •  aspirin 81 MG EC tablet, Take 1 tablet by mouth Daily., Disp: , Rfl:   •  irbesartan (AVAPRO) 300 MG tablet, Take 300 mg by mouth Daily.,  Disp: , Rfl:   •  metoprolol succinate XL (TOPROL-XL) 50 MG 24 hr tablet, TAKE 1 TABLET EVERY DAY, Disp: 90 tablet, Rfl: 1  •  Multiple Vitamins-Minerals (CENTRUM ADULTS) tablet, Take 1 tablet by mouth Daily., Disp: , Rfl:   •  rosuvastatin (CRESTOR) 5 MG tablet, TAKE 1 TABLET EVERY DAY, Disp: 90 tablet, Rfl: 1         Review of Systems   Constitutional: Negative for malaise/fatigue and unexpected weight gain.   Eyes: Negative for blurred vision.   Respiratory: Negative for chest tightness and shortness of breath.    Cardiovascular: Negative for chest pain, palpitations, orthopnea, leg swelling and PND.   Musculoskeletal: Negative for muscle weakness and neck pain.   Neurological: Negative for dizziness.   Psychiatric/Behavioral: Negative for hallucinations and suicidal ideas.       I have reviewed and confirmed the accuracy of the ROS as documented by the MA/LPN/RN Jonathan Johnson MD      Objective   /78 (BP Location: Left arm, Patient Position: Sitting, Cuff Size: Adult)   Pulse 77   Temp 97.7 °F (36.5 °C)   Resp 18   Ht 177.8 cm (70\")   Wt 102 kg (224 lb 9.6 oz)   SpO2 98%   BMI 32.23 kg/m²   BP Readings from Last 3 Encounters:   01/06/23 130/78   09/22/22 141/73   08/05/22 136/64     Wt Readings from Last 3 Encounters:   01/06/23 102 kg (224 lb 9.6 oz)   09/22/22 103 kg (227 lb 1.2 oz)   08/05/22 103 kg (226 lb 6.4 oz)     Physical Exam  Constitutional:       Appearance: Normal appearance. He is well-developed. He is not diaphoretic.   Cardiovascular:      Rate and Rhythm: Normal rate and regular rhythm.      Pulses: Normal pulses.      Heart sounds: Normal heart sounds, S1 normal and S2 normal. No murmur heard.    No friction rub. No gallop.   Pulmonary:      Effort: Pulmonary effort is normal. No accessory muscle usage.      Breath sounds: Normal breath sounds. No stridor. No decreased breath sounds, wheezing, rhonchi or rales.   Abdominal:      General: Bowel sounds are normal. There is no  distension.      Palpations: Abdomen is soft. Abdomen is not rigid. There is no mass or pulsatile mass.      Tenderness: There is no abdominal tenderness. There is no guarding or rebound. Negative signs include Thomas's sign.      Hernia: No hernia is present.   Skin:     General: Skin is warm and dry.      Coloration: Skin is not pale.   Neurological:      Mental Status: He is alert and oriented to person, place, and time.      Coordination: Coordination normal.      Gait: Gait normal.         Data / Lab Results:    No results found for: HGBA1C     Lab Results   Component Value Date    LDL 75 07/19/2022    LDL 81 07/20/2021    LDL 67 07/07/2020     Lab Results   Component Value Date    CHOL 232 (H) 04/16/2018     Lab Results   Component Value Date    TRIG 105 07/19/2022    TRIG 95 07/20/2021    TRIG 104 07/07/2020     Lab Results   Component Value Date    HDL 57 07/19/2022    HDL 54 07/20/2021    HDL 67 07/07/2020     Lab Results   Component Value Date    PSA 1.5 04/16/2018     Lab Results   Component Value Date    WBC 7.4 07/19/2022    HGB 15.8 07/19/2022    HCT 45.7 07/19/2022    MCV 93 07/19/2022     07/19/2022     Lab Results   Component Value Date    TSH 4.610 (H) 07/19/2022      Lab Results   Component Value Date    GLUCOSE 114 (H) 07/19/2022    BUN 10 07/19/2022    CREATININE 1.08 07/19/2022    EGFRIFNONA 77 07/20/2021    EGFRIFAFRI 90 07/20/2021    BCR 9 (L) 07/19/2022    K 4.8 07/19/2022    CO2 24 07/19/2022    CALCIUM 9.6 07/19/2022    PROTENTOTREF 6.6 07/19/2022    ALBUMIN 4.5 07/19/2022    LABIL2 2.1 07/19/2022    AST 26 07/19/2022    ALT 38 07/19/2022     No results found for: BELKIS, RF, SEDRATE   No results found for: CRP   Lab Results   Component Value Date    TIBC 305 07/24/2020    FERRITIN 551 (H) 07/24/2020      No results found for: [id number]       Assessment & Plan      Medications        Problem List         LOS      Health maintenance doing well, vaccines current.  Coated baby aspirin  daily.  Discussed health maintenance, screening test, lifestyle modification.  Followed by dermatology for yearly skin exams.  Hypertension.    Improved today.  Discussed low-sodium diet.  Follow-up recheck  Hyperlipidemia.    Much improved today back on rosuvastatin 5 mg daily.  Discussed diet, exercise, lifestyle modification.  Aggressive LDL target considering CAD.  Positive mild elevation LFTs, benefit statin outweighs risk.  Fatty liver.  Mild elevation LFTs.  Continue fish oil.  Benefit statin outweighs risk. Drinking 6 beers per day/Discontinue/moderate.  Follow-up recheck.  Coronary artery disease.  Has had repeat heart cath 11/22, will get records, considering referral to consider laser arthrectomy, considering stent placement, followed by Dr. Pickett in the past, cardiology for scheduled.  History of complete obstruction LAD with collaterals.  Continue risk factor reduction.  Followed by cardiology Dr. Odom, has had repeat stress testing March/2020..  Cellulitis right lower extremity.  Complicating hematoma.  Improved/resolved, wound healed, status post course wound care.  Neck pain.  Likely secondary to OA.  Ice, rehabilitation exercises discussed.  DDX includes cervical disc disease, consider imaging if persistent symptoms.  Carotid stenosis.  Mild per ultrasound 2018, 2021. continue risk factor reduction.  Screening for colon cancer.  Colonoscopy benign 2018.  Alcohol abuse.  Improved, has cut back to 2 beers per day currently.  Encourage cessation.  Colon screening.  Colonoscopy benign 2018.  COVID-19 viral infection.  Clinically improved/symptoms resolved.  Has completed quarantine.  External otitis.  Cerumen impaction cleared with irrigation today.  Start drops.  Call or return if worsening symptoms.  Cough.  Improved/resolved currently. Usually in the morning, DDx includes allergic rhinitis versus GERD.  Overall benign exam today.  Start antihistamine.  Chest benign x-ray today.  Minimal GERD  symptoms currently, consider trial PPI if persistent symptoms.        Diagnoses and all orders for this visit:    1. HTN (hypertension), benign (Primary)    2. Mixed hyperlipidemia    3. Coronary artery disease involving native heart with angina pectoris, unspecified vessel or lesion type (HCC)  -     Ambulatory Referral to Cardiology    4. Class 1 obesity due to excess calories with serious comorbidity and body mass index (BMI) of 32.0 to 32.9 in adult  Assessment & Plan:  Patient's (Body mass index is 32.23 kg/m².) indicates that they are obese (BMI >30) with health conditions that include hypertension and dyslipidemias . Weight is unchanged. BMI is is above average; BMI management plan is completed. We discussed portion control and increasing exercise.       5. History of tobacco use    6. Need for influenza vaccination  -     Fluzone High-Dose 65+yrs            Expected course, medications, and adverse effects discussed.  Call or return if worsening or persistent symptoms.  I wore protective equipment throughout this patient encounter including a mask, gloves, and eye protection.  Hand hygiene was performed before donning protective equipment and after removal when leaving the room. The complete contents of the Assessment and Plan and Data/Lab Results as documented above have been reviewed and addressed by myself with the patient today as part of an ongoing evaluation / treatment plan.  If some of the documentation has been copied from a previous note and is unchanged it indicates that this problem / plan has been assessed today but is stable from a previous visit and no changes have been recommended.

## 2023-01-06 ENCOUNTER — TELEPHONE (OUTPATIENT)
Dept: FAMILY MEDICINE CLINIC | Facility: CLINIC | Age: 78
End: 2023-01-06

## 2023-01-06 ENCOUNTER — OFFICE VISIT (OUTPATIENT)
Dept: FAMILY MEDICINE CLINIC | Facility: CLINIC | Age: 78
End: 2023-01-06
Payer: MEDICARE

## 2023-01-06 VITALS
WEIGHT: 224.6 LBS | DIASTOLIC BLOOD PRESSURE: 78 MMHG | TEMPERATURE: 97.7 F | OXYGEN SATURATION: 98 % | SYSTOLIC BLOOD PRESSURE: 130 MMHG | BODY MASS INDEX: 32.16 KG/M2 | HEIGHT: 70 IN | HEART RATE: 77 BPM | RESPIRATION RATE: 18 BRPM

## 2023-01-06 DIAGNOSIS — I10 HTN (HYPERTENSION), BENIGN: Primary | ICD-10-CM

## 2023-01-06 DIAGNOSIS — E78.2 MIXED HYPERLIPIDEMIA: ICD-10-CM

## 2023-01-06 DIAGNOSIS — Z87.891 HISTORY OF TOBACCO USE: ICD-10-CM

## 2023-01-06 DIAGNOSIS — I25.119 CORONARY ARTERY DISEASE INVOLVING NATIVE HEART WITH ANGINA PECTORIS, UNSPECIFIED VESSEL OR LESION TYPE: ICD-10-CM

## 2023-01-06 DIAGNOSIS — E66.09 CLASS 1 OBESITY DUE TO EXCESS CALORIES WITH SERIOUS COMORBIDITY AND BODY MASS INDEX (BMI) OF 32.0 TO 32.9 IN ADULT: ICD-10-CM

## 2023-01-06 DIAGNOSIS — Z23 NEED FOR INFLUENZA VACCINATION: ICD-10-CM

## 2023-01-06 PROCEDURE — G0008 ADMIN INFLUENZA VIRUS VAC: HCPCS | Performed by: FAMILY MEDICINE

## 2023-01-06 PROCEDURE — 90662 IIV NO PRSV INCREASED AG IM: CPT | Performed by: FAMILY MEDICINE

## 2023-01-06 PROCEDURE — 99214 OFFICE O/P EST MOD 30 MIN: CPT | Performed by: FAMILY MEDICINE

## 2023-01-06 NOTE — ASSESSMENT & PLAN NOTE
Patient's (Body mass index is 32.23 kg/m².) indicates that they are obese (BMI >30) with health conditions that include hypertension and dyslipidemias . Weight is unchanged. BMI is is above average; BMI management plan is completed. We discussed portion control and increasing exercise.

## 2023-02-07 PROBLEM — I10 BENIGN ESSENTIAL HTN: Status: ACTIVE | Noted: 2023-02-07

## 2023-02-08 ENCOUNTER — OFFICE VISIT (OUTPATIENT)
Dept: CARDIOLOGY | Facility: CLINIC | Age: 78
End: 2023-02-08
Payer: MEDICARE

## 2023-02-08 VITALS
HEART RATE: 54 BPM | HEIGHT: 70 IN | WEIGHT: 219 LBS | BODY MASS INDEX: 31.35 KG/M2 | DIASTOLIC BLOOD PRESSURE: 75 MMHG | SYSTOLIC BLOOD PRESSURE: 135 MMHG | OXYGEN SATURATION: 99 %

## 2023-02-08 DIAGNOSIS — I25.10 CORONARY ARTERY DISEASE INVOLVING NATIVE CORONARY ARTERY OF NATIVE HEART WITHOUT ANGINA PECTORIS: ICD-10-CM

## 2023-02-08 DIAGNOSIS — I10 BENIGN ESSENTIAL HTN: ICD-10-CM

## 2023-02-08 DIAGNOSIS — E78.2 MIXED HYPERLIPIDEMIA: Primary | ICD-10-CM

## 2023-02-08 PROCEDURE — 93000 ELECTROCARDIOGRAM COMPLETE: CPT | Performed by: INTERNAL MEDICINE

## 2023-02-08 PROCEDURE — 99204 OFFICE O/P NEW MOD 45 MIN: CPT | Performed by: INTERNAL MEDICINE

## 2023-05-15 DIAGNOSIS — E78.2 MIXED HYPERLIPIDEMIA: ICD-10-CM

## 2023-05-15 RX ORDER — ROSUVASTATIN CALCIUM 5 MG/1
TABLET, COATED ORAL
Qty: 90 TABLET | Refills: 1 | Status: SHIPPED | OUTPATIENT
Start: 2023-05-15

## 2023-06-09 NOTE — PROGRESS NOTES
Date of Office Visit: 2023  Encounter Provider: Dr. Alon Mccabe  Place of Service: [facility] CARDIOLOGY [place]  Patient Name: Anthony Shelley  :1945  Jonathan Johnson MD    Chief Complaint   Patient presents with    Coronary Artery Disease    Hypertension    Hyperlipidemia    Follow-up     History of Present Illness    I am pleased to see Mr. Shelley in my office today as a follow-up    As you know, patient is 77-year-old white gentleman whose past medical history is significant for hypertension, hyperlipidemia, CAD who came today for follow-up    In 2018, patient had symptoms of chest discomfort and underwent stress echocardiography which showed baseline EF of 45 to 50%.  Postexercise images showed septal and inferior wall hypokinesis consistent with LAD ischemia.  Patient underwent cardiac catheterization by Dr. Soliz.  Patient was noted to have 100% occlusion of LAD and 50% diagonal branch of LAD.  Distal LAD was reconstituted with collaterals.  LCx and RCA was free of significant disease.  Patient was recommended to have medical treatment.  Patient did well.  In 2020, patient underwent stress test at [facility] which showed large apical myocardial infarction without any significant ischemia.  In 2022, patient underwent repeat echocardiogram which showed EF of 45%.  In 2022, patient underwent cardiac catheterization by Dr. Soliz again at [facility].  LVEF was noted to be 55%.  RCA was free of significant disease and it was giving collaterals to LAD.  Left main was free of significant disease.  LAD was 100% occluded in the ostium.  Ramus intermedius was hemodynamically free of significant disease.  LCx was free of significant disease.  D1 branch of LAD after his region had 70 to 80% stenosis.  Patient was recommended to have possible laser intervention of D1 branch of LAD.    Patient came today and he is doing fairly well.  He is fairly  active.  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  Patient denies any orthopnea PND no shortness of breath.  No leg edema noted.    At this stage, patient is doing well.  His blood pressure is controlled.  He is totally asymptomatic.  Current treatment would be continued      Past Medical History:   Diagnosis Date    Abnormal EKG     Impression: q waves anterior leads, asymptomatic eval / rx for htn in progress Follow up recheck stress echo sched    Abnormal stress test     Impression: resting ef 45% with hypomobility septum / distal segment not improved with exercise, and apical lv dysfuxn with exercise has seen Dr Soliz, cardiolyte stress test upcoming Call / return if chest pain on exertion, respiratory difficulty, worsening symptoms.    Abnormal TSH     Abscess of leg, right     Impression: improving, packing left out continue antibiotics    CAD (coronary artery disease)     Impression: 100% occ with collaterals, 50% in another artery followed by fernanda    Carotid bruit     Cellulitis of right lower extremity     mpression: much improved s/p I and D / evacuation hematoma persistent open wound / wound care referrall Topical care discussed. continue antibiotics    Chest pain     Colon cancer screening     Impression: Remote history of colonoscopy per his report, repeat colonoscopy scheduled    Elevated blood pressure reading     Impression: mild elevation Discussed low sodium diet, lifestyle modification. + fh monitor bp at home Follow up recheck consider rx if if persistent elevation.    Elevated LFTs     Impression: mild followed by fernanda holding statin    Fractured skull     Hip pain     History of chickenpox     History of tobacco use     History of use of hearing aid in both ears     HTN (hypertension), benign     Impression: good control    Hyperlipidemia     Lumbar disc disease     Impression: strain ice 20 minutes bid nsaids Rehabilitation  exercises discussed. h/o ldd, has had imaging in past, + chronic sciatica Consider repeat imaging if persistent symptoms.    Medicare annual wellness visit, subsequent     With abnormal findings.    Mixed hyperlipidemia     Impression: Discussed diet, exercise, lifestyle modification. mild ekevation Follow up recheck ------------- Stable Compliant with meds Is getting adequate diet and exercise Goals developed at last visit were met Care management needs are self addressed.    Pain of right lower extremity     Screening for alcoholism     Screening for depression     Screening for hyperlipidemia     Screening PSA (prostate specific antigen)     Impression: psa normal check geoff at upcoming colonoscopy    Sun-damaged skin     Syncope     Impression: clinically improved today, no further episodes, benign exam    Wrist fracture, left, closed, initial encounter          Past Surgical History:   Procedure Laterality Date    CARDIAC CATHETERIZATION  2018    CHOLECYSTECTOMY  1996    SKIN BIOPSY  2022           Current Outpatient Medications:     amLODIPine (NORVASC) 5 MG tablet, Norvasc 5 mg tablet  Take 1 tablet every day by oral route at bedtime for 90 days., Disp: , Rfl:     aspirin 81 MG EC tablet, Take 1 tablet by mouth Daily., Disp: , Rfl:     irbesartan (AVAPRO) 300 MG tablet, Take 1 tablet by mouth Daily., Disp: , Rfl:     metoprolol succinate XL (TOPROL-XL) 50 MG 24 hr tablet, TAKE 1 TABLET EVERY DAY, Disp: 90 tablet, Rfl: 1    Multiple Vitamins-Minerals (CENTRUM ADULTS) tablet, Take 1 tablet by mouth Daily., Disp: , Rfl:     rosuvastatin (CRESTOR) 5 MG tablet, TAKE 1 TABLET EVERY DAY, Disp: 90 tablet, Rfl: 1      Social History     Socioeconomic History    Marital status:    Tobacco Use    Smoking status: Former     Packs/day: 1.00     Years: 32.00     Pack years: 32.00     Types: Cigarettes     Start date: 1961     Quit date: 2000     Years since quittin.4    Smokeless tobacco:  "Never   Vaping Use    Vaping Use: Never used   Substance and Sexual Activity    Alcohol use: Yes     Alcohol/week: 15.0 standard drinks     Types: 15 Cans of beer per week    Drug use: No    Sexual activity: Defer         Review of Systems   Constitutional: Negative for chills and fever.   HENT:  Negative for ear discharge and nosebleeds.    Eyes:  Negative for discharge and redness.   Cardiovascular:  Negative for chest pain, orthopnea, palpitations, paroxysmal nocturnal dyspnea and syncope.   Respiratory:  Negative for cough, shortness of breath and wheezing.    Endocrine: Negative for heat intolerance.   Skin:  Negative for rash.   Musculoskeletal:  Negative for arthritis and myalgias.   Gastrointestinal:  Negative for abdominal pain, melena, nausea and vomiting.   Genitourinary:  Negative for dysuria and hematuria.   Neurological:  Negative for dizziness, light-headedness, numbness and tremors.   Psychiatric/Behavioral:  Negative for depression. The patient is not nervous/anxious.      Procedures    Procedures    No orders to display           Objective:    /76 (BP Location: Right arm, Patient Position: Sitting, Cuff Size: Large Adult)   Pulse 62   Ht 177.8 cm (70\")   Wt 99.8 kg (220 lb)   SpO2 99%   BMI 31.57 kg/m²         Constitutional:       Appearance: Well-developed.   Eyes:      General: No scleral icterus.        Right eye: No discharge.   HENT:      Head: Normocephalic and atraumatic.   Neck:      Thyroid: No thyromegaly.      Lymphadenopathy: No cervical adenopathy.   Pulmonary:      Effort: Pulmonary effort is normal. No respiratory distress.      Breath sounds: Normal breath sounds. No wheezing. No rales.   Cardiovascular:      Normal rate. Regular rhythm.      No gallop.    Edema:     Peripheral edema absent.   Abdominal:      Tenderness: There is no abdominal tenderness.   Skin:     Findings: No erythema or rash.   Neurological:      Mental Status: Alert and oriented to person, place, " and time.           Assessment:       Diagnosis Plan   1. Coronary artery disease involving native coronary artery of native heart without angina pectoris        2. Mixed hyperlipidemia        3. Benign essential HTN                 Plan:       MDM:    1.  CAD:    Patient is clinically doing well.  There is no sign and symptom of angina pectoris.  I will continue current treatment.  I would not proceed with cardiac catheterization at present    2.  Hypertension:    Blood pressure is very well controlled I will continue current treatment    3.  Hyperlipidemia:    Patient is on Crestor.  Repeat lipid panel testing in future

## 2023-06-12 ENCOUNTER — OFFICE VISIT (OUTPATIENT)
Dept: CARDIOLOGY | Facility: CLINIC | Age: 78
End: 2023-06-12
Payer: MEDICARE

## 2023-06-12 VITALS
SYSTOLIC BLOOD PRESSURE: 138 MMHG | HEART RATE: 62 BPM | HEIGHT: 70 IN | BODY MASS INDEX: 31.5 KG/M2 | DIASTOLIC BLOOD PRESSURE: 76 MMHG | WEIGHT: 220 LBS | OXYGEN SATURATION: 99 %

## 2023-06-12 DIAGNOSIS — I10 BENIGN ESSENTIAL HTN: ICD-10-CM

## 2023-06-12 DIAGNOSIS — E78.2 MIXED HYPERLIPIDEMIA: ICD-10-CM

## 2023-06-12 DIAGNOSIS — I25.10 CORONARY ARTERY DISEASE INVOLVING NATIVE CORONARY ARTERY OF NATIVE HEART WITHOUT ANGINA PECTORIS: Primary | ICD-10-CM

## 2023-06-12 PROCEDURE — 1159F MED LIST DOCD IN RCRD: CPT | Performed by: INTERNAL MEDICINE

## 2023-06-12 PROCEDURE — 3078F DIAST BP <80 MM HG: CPT | Performed by: INTERNAL MEDICINE

## 2023-06-12 PROCEDURE — 99214 OFFICE O/P EST MOD 30 MIN: CPT | Performed by: INTERNAL MEDICINE

## 2023-06-12 PROCEDURE — 3075F SYST BP GE 130 - 139MM HG: CPT | Performed by: INTERNAL MEDICINE

## 2023-06-12 PROCEDURE — 1160F RVW MEDS BY RX/DR IN RCRD: CPT | Performed by: INTERNAL MEDICINE

## 2023-08-15 NOTE — PROGRESS NOTES
Subjective   Anthony Shelley is a 78 y.o. male.     Chief Complaint   Patient presents with    Medicare Wellness-subsequent    Hypertension    Hyperlipidemia    Coronary Artery Disease       The patient is here: to discuss health maintenance and disease prevention to follow up on multiple medical problems.  Last comprehensive physical was on 2022.  Previous physical was performed by  Jonathan Johnson MD  Overall has: moderate activity with work/home activities, exercises 2 - 3 times per week, good appetite, feels well with minor complaints, good energy level, is sleeping well, and returned to full activity. Nutrition: appropriate balanced diet, balanced diet, and eating a variety of foods. Last tetanus shot was  7/10/2017 . Patient's last carotid doppler was:  2022  Patient's last stress test was:  3/12/2020  Patient's last PSA was:  1.5 on 2018.      Last Completed Colonoscopy            Discontinued - COLORECTAL CANCER SCREENING  Discontinued        Frequency changed to Never automatically (Topic No Longer Applies)    2018   Colonoscopy component of  COLONOSCOPY    2018  SCANNED - COLONOSCOPY                    History of Present Illness     Recent Hospitalizations:  No hospitalization(s) within the last year..  ccc    I personally reviewed and updated the patient's allergies, medications, problem list, and past medical, surgical, social, and family history. I have reviewed and confirmed the accuracy of the HPI and ROS as documented by the MA/LPN/RN Kathya Valenzuela MA      Family History   Problem Relation Age of Onset    No Known Problems Mother     Heart disease Father     Stroke Father     No Known Problems Sister     Cancer Brother         Lymph node       Social History     Tobacco Use    Smoking status: Former     Packs/day: 1.00     Years: 32.00     Pack years: 32.00     Types: Cigarettes     Start date: 1961     Quit date: 2000     Years since quittin.6    Smokeless  "tobacco: Never   Vaping Use    Vaping Use: Never used   Substance Use Topics    Alcohol use: Yes     Alcohol/week: 15.0 standard drinks     Types: 15 Cans of beer per week    Drug use: No       Past Surgical History:   Procedure Laterality Date    CARDIAC CATHETERIZATION  12/12/2018    CHOLECYSTECTOMY  1996    SKIN BIOPSY  01/20/2022       Patient Active Problem List   Diagnosis    Coronary artery disease involving native coronary artery of native heart without angina pectoris    Mixed hyperlipidemia    Lumbar disc disease    Class 1 obesity due to excess calories with serious comorbidity and body mass index (BMI) of 31.0 to 31.9 in adult    Colon cancer screening    Medicare annual wellness visit, subsequent    Screening for prostate cancer    Callus of foot    Elevated LFTs    Cellulitis of right lower extremity    History of tobacco use    Benign essential HTN         Current Outpatient Medications:     amLODIPine (NORVASC) 5 MG tablet, Norvasc 5 mg tablet  Take 1 tablet every day by oral route at bedtime for 90 days., Disp: , Rfl:     aspirin 81 MG EC tablet, Take 1 tablet by mouth Daily., Disp: , Rfl:     irbesartan (AVAPRO) 300 MG tablet, Take 1 tablet by mouth Daily., Disp: , Rfl:     metoprolol succinate XL (TOPROL-XL) 50 MG 24 hr tablet, TAKE 1 TABLET EVERY DAY, Disp: 90 tablet, Rfl: 1    Multiple Vitamins-Minerals (CENTRUM ADULTS) tablet, Take 1 tablet by mouth Daily., Disp: , Rfl:     rosuvastatin (CRESTOR) 5 MG tablet, TAKE 1 TABLET EVERY DAY, Disp: 90 tablet, Rfl: 1      Objective   /76 (BP Location: Left arm, Patient Position: Sitting, Cuff Size: Adult)   Pulse 70   Temp 98 øF (36.7 øC) (Temporal)   Resp 16   Ht 177.8 cm (70\")   Wt 98.9 kg (218 lb)   SpO2 95%   BMI 31.28 kg/mý   BP Readings from Last 3 Encounters:   08/18/23 132/76   06/12/23 138/76   02/08/23 135/75     Wt Readings from Last 3 Encounters:   08/18/23 98.9 kg (218 lb)   06/12/23 99.8 kg (220 lb)   02/08/23 99.3 kg (219 lb) "         Age-appropriate Screening Schedule:  Refer to the list below for future screening recommendations based on patient's age, sex and/or medical conditions. Orders for these Shannen Dru tests are listed in the plan section. The patient has been provided with a written plan.    Health Maintenance   Topic Date Due    ZOSTER VACCINE (1 of 2) Never done    Pneumococcal Vaccine 65+ (2 - PCV) 2021    COVID-19 Vaccine (4 - Pfizer series) 2022    INFLUENZA VACCINE  10/01/2023    LIPID PANEL  2024    ANNUAL WELLNESS VISIT  2024    TDAP/TD VACCINES (2 - Td or Tdap) 07/10/2027    HEPATITIS C SCREENING  Completed    COLORECTAL CANCER SCREENING  Discontinued       Depression Screen:       2023    10:06 AM   PHQ-2/PHQ-9 Depression Screening   Little Interest or Pleasure in Doing Things 0-->not at all   Feeling Down, Depressed or Hopeless 0-->not at all   PHQ-9: Brief Depression Severity Measure Score 0     I spent more than 16 minutes asking patient questions, counseling and documenting in the chart.    Health Habits and Functional and Cognitive Screenin/18/2023    10:00 AM   Functional & Cognitive Status   Do you have difficulty preparing food and eating? No   Do you have difficulty bathing yourself, getting dressed or grooming yourself? No   Do you have difficulty using the toilet? No   Do you have difficulty moving around from place to place? No   Do you have trouble with steps or getting out of a bed or a chair? No   Current Diet Well Balanced Diet   Dental Exam Up to date   Eye Exam Up to date   Exercise (times per week) 3 times per week   Current Exercises Include Yard Work;Walking   Do you need help using the phone?  No   Are you deaf or do you have serious difficulty hearing?  No   Do you need help to go to places out of walking distance? No   Do you need help shopping? No   Do you need help preparing meals?  No   Do you need help with housework?  No   Do you need help with  laundry? No   Do you need help taking your medications? No   Do you need help managing money? No   Do you ever drive or ride in a car without wearing a seat belt? No   Have you felt unusual stress, anger or loneliness in the last month? No   Who do you live with? Spouse   If you need help, do you have trouble finding someone available to you? No   Do you have difficulty concentrating, remembering or making decisions? Yes       Does the patient have evidence of cognitive impairment? No    Advance Care Planning:  ACP discussion was declined by the patient. Patient does not have an advance directive, declines further assistance.     A face-to-face visit was completed today with patient.  Counseling explanation, and discussion of advanced directives was performed.   The last advanced care visit was performed in 2022.  In a near life ending situation, from which he is not expected to recover functionally, and he is not able to speak for him, he does not want life sustaining measures. We discussed feeding tubes, ventilators and cardiac support as well as dialysis.    I spent more than 16 minutes discussing Advanced Care Planning information and documenting in the chart.    Identification of Risk Factors:  Risk factors include: Advance Directive Discussion  Colon Cancer Screening  Dementia/Memory   Fall Risk  Immunizations Discussed/Encouraged (specific immunizations; Prevnar 20 (Pneumococcal 20-valent conjugate), Shingrix, and COVID19 )  Obesity/Overweight   Prostate Cancer Screening   Urinary Incontinence.    Compared to one year ago, the patient feels his physical health is better.  Compared to one year ago, the patient feels his mental health is better.    Patient Self-Management and Personalized Health Advice  The patient has been provided with information about: diet, exercise, weight management, fall prevention, and mental health concerns and preventive services including:   Annual Wellness Visit (AWV)  Bone Density  Measurements  Cardiovascular Disease Screening Tests (may do this order every 5 years in beneficiaries without signs or symptoms of cardiovascular disease).      Assessment & Plan      Medications        Problem List         LOS      Diagnoses and all orders for this visit:    1. Medicare annual wellness visit, subsequent (Primary)    2. Benign essential HTN    3. Mixed hyperlipidemia    4. Coronary artery disease involving native coronary artery of native heart without angina pectoris    5. Class 1 obesity due to excess calories with serious comorbidity and body mass index (BMI) of 31.0 to 31.9 in adult  Assessment & Plan:  Patient's (Body mass index is 31.28 kg/mý.) indicates that they are obese (BMI >30) with health conditions that include hypertension, coronary heart disease, and dyslipidemias . Weight is improving with lifestyle modifications. BMI  is above average; BMI management plan is completed. We discussed portion control and increasing exercise.       6. History of tobacco use    7. Colon cancer screening    8. Screening for prostate cancer        Medicare wellness.  Discussed health maintenance, routine immunizations, screening tests, lifestyle modification.

## 2023-08-15 NOTE — PROGRESS NOTES
Subjective   Anthony Shelley is a 78 y.o. male.     Chief Complaint   Patient presents with    Medicare Wellness-subsequent    Hypertension    Hyperlipidemia    Coronary Artery Disease       Coronary Artery Disease  Presents for follow-up visit. Pertinent negatives include no chest pain, chest pressure, chest tightness, dizziness, leg swelling, muscle weakness, palpitations, shortness of breath or weight gain. Risk factors include hyperlipidemia and obesity. Compliance with diet is good. Compliance with exercise is good. Compliance with medications is good.   Hypertension  This is a chronic problem. The current episode started more than 1 year ago. The problem has been gradually improving since onset. The problem is controlled. Associated symptoms include sweats. Pertinent negatives include no anxiety, blurred vision, chest pain, headaches, malaise/fatigue, neck pain, orthopnea, palpitations, peripheral edema, PND or shortness of breath. (He does currently take his blood pressure once daily, and has been running higher than normal. Averaging 140/70s) There are no associated agents to hypertension. Risk factors for coronary artery disease include dyslipidemia, male gender and obesity. Current antihypertension treatment includes angiotensin blockers, calcium channel blockers and beta blockers. The current treatment provides mild improvement. Hypertensive end-organ damage includes CAD/MI.   Hyperlipidemia  This is a chronic problem. The current episode started more than 1 year ago. The problem is controlled. Recent lipid tests were reviewed and are normal. Exacerbating diseases include obesity. There are no known factors aggravating his hyperlipidemia. Pertinent negatives include no chest pain, leg pain or shortness of breath. Current antihyperlipidemic treatment includes diet change. There are no compliance problems.  Risk factors for coronary artery disease include hypertension, male sex and obesity.          I  personally reviewed and updated the patient's allergies, medications, problem list, and past medical, surgical, social, and family history. I have reviewed and confirmed the accuracy of the History of Present Illness and Review of Symptoms as documented by the MA/LPN/RN. Jonathan Johnson MD    Family History   Problem Relation Age of Onset    No Known Problems Mother     Heart disease Father     Stroke Father     No Known Problems Sister     Cancer Brother         Lymph node       Social History     Tobacco Use    Smoking status: Former     Packs/day: 1.00     Years: 32.00     Pack years: 32.00     Types: Cigarettes     Start date: 1961     Quit date: 2000     Years since quittin.6    Smokeless tobacco: Never   Vaping Use    Vaping Use: Never used   Substance Use Topics    Alcohol use: Yes     Alcohol/week: 15.0 standard drinks     Types: 15 Cans of beer per week    Drug use: No       Past Surgical History:   Procedure Laterality Date    CARDIAC CATHETERIZATION  2018    CHOLECYSTECTOMY  1996    SKIN BIOPSY  2022       Patient Active Problem List   Diagnosis    Coronary artery disease involving native coronary artery of native heart without angina pectoris    Mixed hyperlipidemia    Lumbar disc disease    Class 1 obesity due to excess calories with serious comorbidity and body mass index (BMI) of 31.0 to 31.9 in adult    Colon cancer screening    Medicare annual wellness visit, subsequent    Screening for prostate cancer    Callus of foot    Elevated LFTs    Cellulitis of right lower extremity    History of tobacco use    Benign essential HTN         Current Outpatient Medications:     amLODIPine (NORVASC) 5 MG tablet, Norvasc 5 mg tablet  Take 1 tablet every day by oral route at bedtime for 90 days., Disp: , Rfl:     aspirin 81 MG EC tablet, Take 1 tablet by mouth Daily., Disp: , Rfl:     irbesartan (AVAPRO) 300 MG tablet, Take 1 tablet by mouth Daily., Disp: , Rfl:     metoprolol succinate XL  "(TOPROL-XL) 50 MG 24 hr tablet, TAKE 1 TABLET EVERY DAY, Disp: 90 tablet, Rfl: 1    Multiple Vitamins-Minerals (CENTRUM ADULTS) tablet, Take 1 tablet by mouth Daily., Disp: , Rfl:     rosuvastatin (CRESTOR) 5 MG tablet, TAKE 1 TABLET EVERY DAY, Disp: 90 tablet, Rfl: 1         Review of Systems   Constitutional:  Negative for chills, diaphoresis, malaise/fatigue and unexpected weight gain.   HENT:  Negative for trouble swallowing and voice change.    Eyes:  Negative for blurred vision and visual disturbance.   Respiratory:  Negative for chest tightness and shortness of breath.    Cardiovascular:  Negative for chest pain, palpitations, orthopnea, leg swelling and PND.   Gastrointestinal:  Negative for abdominal pain and nausea.   Endocrine: Negative for polydipsia and polyphagia.   Genitourinary:  Negative for hematuria.   Musculoskeletal:  Negative for muscle weakness, neck pain and neck stiffness.   Skin:  Negative for color change and pallor.   Allergic/Immunologic: Negative for immunocompromised state.   Neurological:  Negative for dizziness, seizures and syncope.   Hematological:  Negative for adenopathy.   Psychiatric/Behavioral:  Negative for hallucinations, sleep disturbance and suicidal ideas.      I have reviewed and confirmed the accuracy of the ROS as documented by the MA/LPN/RN Jonathan Johnson MD      Objective   /76 (BP Location: Left arm, Patient Position: Sitting, Cuff Size: Adult)   Pulse 70   Temp 98 øF (36.7 øC) (Temporal)   Resp 16   Ht 177.8 cm (70\")   Wt 98.9 kg (218 lb)   SpO2 95%   BMI 31.28 kg/mý   BP Readings from Last 3 Encounters:   08/18/23 132/76   06/12/23 138/76   02/08/23 135/75     Wt Readings from Last 3 Encounters:   08/18/23 98.9 kg (218 lb)   06/12/23 99.8 kg (220 lb)   02/08/23 99.3 kg (219 lb)     Physical Exam  Constitutional:       Appearance: He is well-developed. He is not diaphoretic.   HENT:      Head: Normocephalic.      Right Ear: Tympanic membrane, ear canal " and external ear normal.      Left Ear: Tympanic membrane, ear canal and external ear normal.      Nose: Nose normal.   Eyes:      General: Lids are normal.      Conjunctiva/sclera: Conjunctivae normal.      Pupils: Pupils are equal, round, and reactive to light.   Neck:      Thyroid: No thyromegaly.      Vascular: No carotid bruit or JVD.      Trachea: No tracheal deviation.   Cardiovascular:      Rate and Rhythm: Normal rate and regular rhythm.      Heart sounds: Normal heart sounds. No murmur heard.    No friction rub. No gallop.   Pulmonary:      Effort: Pulmonary effort is normal.      Breath sounds: Normal breath sounds. No stridor. No decreased breath sounds, wheezing or rales.   Abdominal:      General: Bowel sounds are normal. There is no distension.      Palpations: Abdomen is soft. There is no mass.      Tenderness: There is no abdominal tenderness. There is no guarding or rebound.      Hernia: No hernia is present.   Lymphadenopathy:      Head:      Right side of head: No submental, submandibular, tonsillar, preauricular, posterior auricular or occipital adenopathy.      Left side of head: No submental, submandibular, tonsillar, preauricular, posterior auricular or occipital adenopathy.      Cervical: No cervical adenopathy.   Skin:     General: Skin is warm and dry.      Coloration: Skin is not pale.   Neurological:      Mental Status: He is alert and oriented to person, place, and time.      Cranial Nerves: No cranial nerve deficit.      Sensory: No sensory deficit.      Coordination: Coordination normal.      Gait: Gait normal.      Deep Tendon Reflexes: Reflexes are normal and symmetric.       Data / Lab Results:    No results found for: HGBA1C     Lab Results   Component Value Date    LDL 58 08/11/2023    LDL 75 07/19/2022    LDL 81 07/20/2021     Lab Results   Component Value Date    CHOL 232 (H) 04/16/2018     Lab Results   Component Value Date    TRIG 86 08/11/2023    TRIG 105 07/19/2022    TRIG  95 07/20/2021     Lab Results   Component Value Date    HDL 61 08/11/2023    HDL 57 07/19/2022    HDL 54 07/20/2021     Lab Results   Component Value Date    PSA 1.5 04/16/2018     Lab Results   Component Value Date    WBC 6.2 08/11/2023    HGB 15.8 08/11/2023    HCT 45.2 08/11/2023    MCV 93 08/11/2023     08/11/2023     Lab Results   Component Value Date    TSH 4.510 (H) 08/11/2023      Lab Results   Component Value Date    GLUCOSE 94 08/11/2023    BUN 10 08/11/2023    CREATININE 1.00 08/11/2023    EGFRIFNONA 77 07/20/2021    EGFRIFAFRI 90 07/20/2021    BCR 10 08/11/2023    K 5.5 (H) 08/11/2023    CO2 24 08/11/2023    CALCIUM 9.6 08/11/2023    PROTENTOTREF 6.5 08/11/2023    ALBUMIN 4.4 08/11/2023    LABIL2 2.1 08/11/2023    AST 31 08/11/2023    ALT 40 08/11/2023     No results found for: BELKIS, RF, SEDRATE   No results found for: CRP   Lab Results   Component Value Date    TIBC 305 07/24/2020    FERRITIN 551 (H) 07/24/2020      No results found for: [id number]       Assessment & Plan      Medications        Problem List         LOS    Medicare wellness.  Doing well, vaccines updated.  Coated baby aspirin, on every other day Rx per cardiology..  Discussed health maintenance, screening test, lifestyle modification.  Followed by dermatology for yearly skin exams.  Hypertension.    Improved today.  Discussed low-sodium diet.  Follow-up recheck  Hyperlipidemia.    Much improved today back on rosuvastatin 5 mg daily.  Discussed diet, exercise, lifestyle modification.  Aggressive LDL target considering CAD.  Positive mild elevation LFTs, benefit statin outweighs risk.  Fatty liver.  Mild elevation LFTs.  Continue fish oil.  Benefit statin outweighs risk. Drinking 6 beers per day/Discontinue/moderate.  Follow-up recheck.  Coronary artery disease.  Has had repeat heart cath 11/22, will get records, considering referral to consider laser arthrectomy, considering stent placement, followed by Dr. Pickett in the past,  cardiology for scheduled.  History of complete obstruction LAD with collaterals.  Continue risk factor reduction.  Followed by cardiology Dr. Odom, has had repeat stress testing March/2020..  Cellulitis right lower extremity.  Complicating hematoma.  Improved/resolved, wound healed, status post course wound care.  Neck pain.  Likely secondary to OA.  Ice, rehabilitation exercises discussed.  DDX includes cervical disc disease, consider imaging if persistent symptoms.  Carotid stenosis.  Mild per ultrasound 2018, 2021. continue risk factor reduction.  Screening for colon cancer.  Colonoscopy benign 2018.  Alcohol abuse.  Improved, has cut back to 2 beers per day currently.  Encourage cessation.  Colon screening.  Colonoscopy benign 2018.  COVID-19 viral infection.  Clinically improved/symptoms resolved.  Has completed quarantine.  External otitis.  Cerumen impaction cleared with irrigation today.  Start drops.  Call or return if worsening symptoms.  Cough.  Improved/resolved currently. Usually in the morning, DDx includes allergic rhinitis versus GERD.  Overall benign exam today.  Start antihistamine.  Chest benign x-ray today.  Minimal GERD symptoms currently, consider trial PPI if persistent symptoms.        Diagnoses and all orders for this visit:    1. Medicare annual wellness visit, subsequent (Primary)  -     POCT urinalysis dipstick, manual    2. Benign essential HTN  -     CBC & Differential; Future  -     Comprehensive Metabolic Panel; Future  -     TSH; Future    3. Mixed hyperlipidemia  -     Lipid Panel With / Chol / HDL Ratio; Future    4. Coronary artery disease involving native coronary artery of native heart without angina pectoris    5. Class 1 obesity due to excess calories with serious comorbidity and body mass index (BMI) of 31.0 to 31.9 in adult  Assessment & Plan:  Patient's (Body mass index is 31.28 kg/mý.) indicates that they are obese (BMI >30) with health conditions that include  hypertension, coronary heart disease, and dyslipidemias . Weight is improving with lifestyle modifications. BMI  is above average; BMI management plan is completed. We discussed portion control and increasing exercise.       6. History of tobacco use    7. Colon cancer screening    8. Screening for prostate cancer    9. Need for pneumococcal 20-valent conjugate vaccination  -     Pneumococcal Conjugate Vaccine 20-Valent All    10. HTN (hypertension), benign    11. Thyroid disorder  -     TSH; Future  -     T4, Free; Future              Expected course, medications, and adverse effects discussed.  Call or return if worsening or persistent symptoms.  I wore protective equipment throughout this patient encounter including a mask, gloves, and eye protection.  Hand hygiene was performed before donning protective equipment and after removal when leaving the room. The complete contents of the Assessment and Plan and Data/Lab Results as documented above have been reviewed and addressed by myself with the patient today as part of an ongoing evaluation / treatment plan.  If some of the documentation has been copied from a previous note and is unchanged it indicates that this problem / plan has been assessed today but is stable from a previous visit and no changes have been recommended.

## 2023-08-18 ENCOUNTER — OFFICE VISIT (OUTPATIENT)
Dept: FAMILY MEDICINE CLINIC | Facility: CLINIC | Age: 78
End: 2023-08-18
Payer: MEDICARE

## 2023-08-18 VITALS
BODY MASS INDEX: 31.21 KG/M2 | HEART RATE: 70 BPM | OXYGEN SATURATION: 95 % | SYSTOLIC BLOOD PRESSURE: 132 MMHG | WEIGHT: 218 LBS | HEIGHT: 70 IN | TEMPERATURE: 98 F | RESPIRATION RATE: 16 BRPM | DIASTOLIC BLOOD PRESSURE: 76 MMHG

## 2023-08-18 DIAGNOSIS — Z12.5 SCREENING FOR PROSTATE CANCER: ICD-10-CM

## 2023-08-18 DIAGNOSIS — E66.09 CLASS 1 OBESITY DUE TO EXCESS CALORIES WITH SERIOUS COMORBIDITY AND BODY MASS INDEX (BMI) OF 31.0 TO 31.9 IN ADULT: ICD-10-CM

## 2023-08-18 DIAGNOSIS — Z23 NEED FOR PNEUMOCOCCAL 20-VALENT CONJUGATE VACCINATION: ICD-10-CM

## 2023-08-18 DIAGNOSIS — I10 HTN (HYPERTENSION), BENIGN: ICD-10-CM

## 2023-08-18 DIAGNOSIS — I25.10 CORONARY ARTERY DISEASE INVOLVING NATIVE CORONARY ARTERY OF NATIVE HEART WITHOUT ANGINA PECTORIS: ICD-10-CM

## 2023-08-18 DIAGNOSIS — E07.9 THYROID DISORDER: ICD-10-CM

## 2023-08-18 DIAGNOSIS — I10 BENIGN ESSENTIAL HTN: ICD-10-CM

## 2023-08-18 DIAGNOSIS — Z12.11 COLON CANCER SCREENING: ICD-10-CM

## 2023-08-18 DIAGNOSIS — Z87.891 HISTORY OF TOBACCO USE: ICD-10-CM

## 2023-08-18 DIAGNOSIS — E78.2 MIXED HYPERLIPIDEMIA: ICD-10-CM

## 2023-08-18 DIAGNOSIS — Z00.00 MEDICARE ANNUAL WELLNESS VISIT, SUBSEQUENT: Primary | ICD-10-CM

## 2023-08-18 NOTE — ASSESSMENT & PLAN NOTE
Patient's (Body mass index is 31.28 kg/mý.) indicates that they are obese (BMI >30) with health conditions that include hypertension, coronary heart disease, and dyslipidemias . Weight is improving with lifestyle modifications. BMI  is above average; BMI management plan is completed. We discussed portion control and increasing exercise.

## 2023-10-19 ENCOUNTER — TELEPHONE (OUTPATIENT)
Dept: FAMILY MEDICINE CLINIC | Facility: CLINIC | Age: 78
End: 2023-10-19
Payer: MEDICARE

## 2023-10-19 NOTE — TELEPHONE ENCOUNTER
Wife is here for an appointment with her pcp. She states patient is having bad sciatica pain. Requesting something for pain be sent to [facility]. Informed wife I would put message through but  is not in the office on Thursdays & to be aware patient may have to be seen for appointment first.

## 2023-10-24 NOTE — PROGRESS NOTES
Subjective   Anthony Shelley is a 78 y.o. male.     Chief Complaint   Patient presents with    Back Pain    Cough       Back Pain  This is a recurrent problem. The current episode started 1 to 4 weeks ago. The problem occurs daily. The pain is present in the gluteal. The quality of the pain is described as aching. The pain radiates to the right thigh. The pain is at a severity of 8/10. The pain is severe. The symptoms are aggravated by position, sitting, standing and twisting. Associated symptoms include leg pain, numbness and tingling. Pertinent negatives include no abdominal pain or chest pain. (Trouble walking) He has tried heat for the symptoms. The treatment provided mild relief.   Cough  This is a new problem. The current episode started in the past 7 days. The problem has been unchanged. The cough is Productive of sputum. Associated symptoms include rhinorrhea. Pertinent negatives include no chest pain, chills or shortness of breath. The symptoms are aggravated by pollens. He has tried OTC cough suppressant for the symptoms. The treatment provided moderate relief.        I personally reviewed and updated the patient's allergies, medications, problem list, and past medical, surgical, social, and family history. I have reviewed and confirmed the accuracy of the History of Present Illness and Review of Symptoms as documented by the MA/LPN/RN. Jonathan Johnson MD    Family History   Problem Relation Age of Onset    No Known Problems Mother     Heart disease Father     Stroke Father     No Known Problems Sister     Cancer Brother         Lymph node       Social History     Tobacco Use    Smoking status: Former     Packs/day: 1.00     Years: 39.00     Additional pack years: 0.00     Total pack years: 39.00     Types: Cigarettes     Start date: 1961     Quit date: 2000     Years since quittin.8    Smokeless tobacco: Never   Vaping Use    Vaping Use: Never used   Substance Use Topics    Alcohol use: Yes      Alcohol/week: 15.0 standard drinks of alcohol     Types: 15 Cans of beer per week    Drug use: No       Past Surgical History:   Procedure Laterality Date    CARDIAC CATHETERIZATION  12/12/2018    CHOLECYSTECTOMY  1996    SKIN BIOPSY  01/20/2022       Patient Active Problem List   Diagnosis    Coronary artery disease involving native coronary artery of native heart without angina pectoris    Mixed hyperlipidemia    Lumbar disc disease    Class 1 obesity due to excess calories with serious comorbidity and body mass index (BMI) of 30.0 to 30.9 in adult    Colon cancer screening    Medicare annual wellness visit, subsequent    Screening for prostate cancer    Callus of foot    Elevated LFTs    Cellulitis of right lower extremity    History of tobacco use    Benign essential HTN    Acute bacterial bronchitis         Current Outpatient Medications:     amLODIPine (NORVASC) 5 MG tablet, Norvasc 5 mg tablet  Take 1 tablet every day by oral route at bedtime for 90 days., Disp: , Rfl:     aspirin 81 MG EC tablet, Take 1 tablet by mouth Daily., Disp: , Rfl:     azithromycin (ZITHROMAX) 250 MG tablet, Take 2 tablets the first day, then 1 tablet daily for 4 days., Disp: 6 tablet, Rfl: 0    irbesartan (AVAPRO) 300 MG tablet, Take 1 tablet by mouth Daily., Disp: , Rfl:     metoprolol succinate XL (TOPROL-XL) 50 MG 24 hr tablet, TAKE 1 TABLET EVERY DAY, Disp: 90 tablet, Rfl: 1    Multiple Vitamins-Minerals (CENTRUM ADULTS) tablet, Take 1 tablet by mouth Daily., Disp: , Rfl:     Omega-3 Fatty Acids (FISH OIL PO), Take  by mouth., Disp: , Rfl:     predniSONE (DELTASONE) 5 MG tablet, 40mg x 3 days, 20mg x 3 days, 10mg x 3 days, 5mg x 3 days, Disp: 45 tablet, Rfl: 0    rosuvastatin (CRESTOR) 5 MG tablet, TAKE 1 TABLET EVERY DAY, Disp: 90 tablet, Rfl: 1    cyclobenzaprine (FLEXERIL) 10 MG tablet, Take 0.5-1 tablets by mouth At Night As Needed for Muscle Spasms., Disp: 30 tablet, Rfl: 2         Review of Systems   Constitutional:   "Negative for chills and diaphoresis.   HENT:  Positive for rhinorrhea.    Eyes:  Negative for visual disturbance.   Respiratory:  Positive for cough. Negative for shortness of breath.    Cardiovascular:  Negative for chest pain and palpitations.   Gastrointestinal:  Negative for abdominal pain and nausea.   Endocrine: Negative for polydipsia and polyphagia.   Musculoskeletal:  Positive for back pain. Negative for neck stiffness.   Skin:  Negative for color change and pallor.   Neurological:  Positive for tingling and numbness. Negative for seizures and syncope.   Hematological:  Negative for adenopathy.   Psychiatric/Behavioral:  Negative for hallucinations and suicidal ideas.        I have reviewed and confirmed the accuracy of the ROS as documented by the MA/LPN/RN Jonathan Johnson MD      Objective   /70 (BP Location: Left arm, Patient Position: Sitting, Cuff Size: Adult)   Pulse 68   Temp 98 °F (36.7 °C) (Temporal)   Resp 18   Ht 177.8 cm (70\")   Wt 97 kg (213 lb 12.8 oz)   SpO2 96%   BMI 30.68 kg/m²   BP Readings from Last 3 Encounters:   10/25/23 124/70   08/18/23 132/76   06/12/23 138/76     Wt Readings from Last 3 Encounters:   10/25/23 97 kg (213 lb 12.8 oz)   08/18/23 98.9 kg (218 lb)   06/12/23 99.8 kg (220 lb)     Physical Exam  Constitutional:       Appearance: Normal appearance. He is well-developed. He is not diaphoretic.   HENT:      Head: Normocephalic.      Right Ear: Hearing, tympanic membrane, ear canal and external ear normal. A middle ear effusion is present. Tympanic membrane is erythematous.      Left Ear: Hearing, tympanic membrane, ear canal and external ear normal. A middle ear effusion is present. Tympanic membrane is erythematous.      Nose: Nose normal. Congestion present.      Right Sinus: Maxillary sinus tenderness and frontal sinus tenderness present.      Left Sinus: Maxillary sinus tenderness and frontal sinus tenderness present.      Mouth/Throat:      Pharynx: " Posterior oropharyngeal erythema present.      Tonsils: No tonsillar abscesses. 1+ on the right. 1+ on the left.   Eyes:      General: Lids are normal.      Conjunctiva/sclera: Conjunctivae normal.      Pupils: Pupils are equal, round, and reactive to light.   Neck:      Thyroid: No thyromegaly.      Vascular: No carotid bruit or JVD.      Trachea: No tracheal deviation.      Meningeal: Brudzinski's sign and Kernig's sign absent.   Cardiovascular:      Rate and Rhythm: Normal rate and regular rhythm.      Pulses: Normal pulses.      Heart sounds: Normal heart sounds, S1 normal and S2 normal. No murmur heard.     No friction rub. No gallop.   Pulmonary:      Effort: Pulmonary effort is normal. No accessory muscle usage or respiratory distress.      Breath sounds: Normal breath sounds. No stridor. Examination of the right-upper field reveals wheezing and rhonchi. Examination of the left-upper field reveals wheezing and rhonchi. Examination of the right-middle field reveals wheezing and rhonchi. Examination of the left-middle field reveals wheezing and rhonchi. Examination of the right-lower field reveals wheezing and rhonchi. Examination of the left-lower field reveals wheezing and rhonchi. No decreased breath sounds, wheezing, rhonchi or rales.   Abdominal:      General: Bowel sounds are normal. There is no distension.      Palpations: Abdomen is soft. Abdomen is not rigid. There is no mass or pulsatile mass.      Tenderness: There is no abdominal tenderness. There is no guarding or rebound. Negative signs include Thomas's sign.      Hernia: No hernia is present.   Musculoskeletal:      Thoracic back: Normal. No swelling, edema, deformity, lacerations, spasms or tenderness. Normal range of motion.      Lumbar back: No swelling, edema, deformity, spasms or tenderness. Normal range of motion.      Right hip: No deformity, tenderness or crepitus. Normal range of motion. Normal strength.      Left hip: Normal. No  "deformity, tenderness or crepitus. Normal range of motion. Normal strength.   Lymphadenopathy:      Head:      Right side of head: No submental, submandibular, tonsillar, preauricular, posterior auricular or occipital adenopathy.      Left side of head: No submental, submandibular, tonsillar, preauricular, posterior auricular or occipital adenopathy.      Cervical: No cervical adenopathy.   Skin:     General: Skin is warm and dry.      Coloration: Skin is not pale.   Neurological:      Mental Status: He is alert and oriented to person, place, and time.      Cranial Nerves: No cranial nerve deficit.      Sensory: No sensory deficit.      Motor: No atrophy or abnormal muscle tone.      Coordination: Coordination normal.      Gait: Gait normal.      Deep Tendon Reflexes: Reflexes are normal and symmetric.         Data / Lab Results:    No results found for: \"HGBA1C\"     Lab Results   Component Value Date    LDL 58 08/11/2023    LDL 75 07/19/2022    LDL 81 07/20/2021     Lab Results   Component Value Date    CHOL 232 (H) 04/16/2018     Lab Results   Component Value Date    TRIG 86 08/11/2023    TRIG 105 07/19/2022    TRIG 95 07/20/2021     Lab Results   Component Value Date    HDL 61 08/11/2023    HDL 57 07/19/2022    HDL 54 07/20/2021     Lab Results   Component Value Date    PSA 1.5 04/16/2018     Lab Results   Component Value Date    WBC 6.2 08/11/2023    HGB 15.8 08/11/2023    HCT 45.2 08/11/2023    MCV 93 08/11/2023     08/11/2023     Lab Results   Component Value Date    TSH 4.510 (H) 08/11/2023      Lab Results   Component Value Date    GLUCOSE 94 08/11/2023    BUN 10 08/11/2023    CREATININE 1.00 08/11/2023    EGFRIFNONA 77 07/20/2021    EGFRIFAFRI 90 07/20/2021    BCR 10 08/11/2023    K 5.5 (H) 08/11/2023    CO2 24 08/11/2023    CALCIUM 9.6 08/11/2023    PROTENTOTREF 6.5 08/11/2023    ALBUMIN 4.4 08/11/2023    LABIL2 2.1 08/11/2023    AST 31 08/11/2023    ALT 40 08/11/2023     No results found for: " "\"BELKIS\", \"RF\", \"SEDRATE\"   No results found for: \"CRP\"   Lab Results   Component Value Date    TIBC 305 07/24/2020    FERRITIN 551 (H) 07/24/2020      No results found for: \"CCDSMOYJ93\"       Assessment & Plan      Medications        Problem List         LOS Medicare wellness.  Doing well, vaccines updated.  Coated baby aspirin, on every other day Rx per cardiology..  Discussed health maintenance, screening test, lifestyle modification.  Followed by dermatology for yearly skin exams.  Hypertension.    Improved today.  Discussed low-sodium diet.  Follow-up recheck  Hyperlipidemia.    Much improved today back on rosuvastatin 5 mg daily.  Discussed diet, exercise, lifestyle modification.  Aggressive LDL target considering CAD.  Positive mild elevation LFTs, benefit statin outweighs risk.  Fatty liver.  Mild elevation LFTs.  Continue fish oil.  Benefit statin outweighs risk. Drinking 6 beers per day/Discontinue/moderate.  Follow-up recheck.  Coronary artery disease.  Has had repeat heart cath 11/22, will get records, considering referral to consider laser arthrectomy, considering stent placement, followed by Dr. Pickett in the past, cardiology for scheduled.  History of complete obstruction LAD with collaterals.  Continue risk factor reduction.  Followed by cardiology Dr. Odom, has had repeat stress testing March/2020..  Cellulitis right lower extremity.  Complicating hematoma.  Improved/resolved, wound healed, status post course wound care.  Neck pain.  Likely secondary to OA.  Ice, rehabilitation exercises discussed.  DDX includes cervical disc disease, consider imaging if persistent symptoms.  Carotid stenosis.  Mild per ultrasound 2018, 2021. continue risk factor reduction.  Screening for colon cancer.  Colonoscopy benign 2018.  Alcohol abuse.  Improved, has cut back to 2 beers per day currently.  Encourage cessation.  Colon screening.  Colonoscopy benign 2018.  COVID-19 viral infection.  Clinically " improved/symptoms resolved.  Has completed quarantine.  External otitis.  Cerumen impaction cleared with irrigation today.  Start drops.  Call or return if worsening symptoms.  Cough.  Improved/resolved currently. Usually in the morning, DDx includes allergic rhinitis versus GERD.  Overall benign exam today.  Start antihistamine.  Chest benign x-ray today.  Minimal GERD symptoms currently, consider trial PPI if persistent symptoms.  Acute bacterial bronchitis.  Start antibiotics.  Increase fluid intake.  Call return if you have worsening symptoms.  Lumbar disc disease.  Flare currently.  Overall doing well, infrequent episodes.  Check x-ray, start prednisone/muscle relaxant.  Rehabilitation exercise discussed.  Consider MRI/anesthesiology referral if persistent symptoms.      Diagnoses and all orders for this visit:    1. Acute right-sided low back pain without sciatica (Primary)  -     Discontinue: predniSONE (DELTASONE) 5 MG tablet; 40mg x 3 days, 20mg x 3 days, 10mg x 3 days, 5mg x 3 days  Dispense: 45 tablet; Refill: 0  -     XR Spine Lumbar Complete 4+VW  -     cyclobenzaprine (FLEXERIL) 10 MG tablet; Take 0.5-1 tablets by mouth At Night As Needed for Muscle Spasms.  Dispense: 30 tablet; Refill: 2  -     predniSONE (DELTASONE) 5 MG tablet; 40mg x 3 days, 20mg x 3 days, 10mg x 3 days, 5mg x 3 days  Dispense: 45 tablet; Refill: 0    2. Class 1 obesity due to excess calories with serious comorbidity and body mass index (BMI) of 30.0 to 30.9 in adult  Assessment & Plan:  Patient's (Body mass index is 30.68 kg/m².) indicates that they are obese (BMI >30) with health conditions that include hypertension and dyslipidemias . Weight is improving with lifestyle modifications. BMI  is above average; BMI management plan is completed. We discussed portion control and increasing exercise.       3. History of tobacco use    4. Acute cough  -     Discontinue: azithromycin (ZITHROMAX) 250 MG tablet; Take 2 tablets the first  day, then 1 tablet daily for 4 days.  Dispense: 6 tablet; Refill: 0  -     Discontinue: predniSONE (DELTASONE) 5 MG tablet; 40mg x 3 days, 20mg x 3 days, 10mg x 3 days, 5mg x 3 days  Dispense: 45 tablet; Refill: 0  -     azithromycin (ZITHROMAX) 250 MG tablet; Take 2 tablets the first day, then 1 tablet daily for 4 days.  Dispense: 6 tablet; Refill: 0  -     predniSONE (DELTASONE) 5 MG tablet; 40mg x 3 days, 20mg x 3 days, 10mg x 3 days, 5mg x 3 days  Dispense: 45 tablet; Refill: 0    5. Need for immunization against influenza  -     Fluzone High-Dose 65+yrs    6. Acute bacterial bronchitis    7. Lumbar disc disease    8. Coronary artery disease involving native coronary artery of native heart without angina pectoris    9. Benign essential HTN              Expected course, medications, and adverse effects discussed.  Call or return if worsening or persistent symptoms.  I wore protective equipment throughout this patient encounter including a mask, gloves, and eye protection.  Hand hygiene was performed before donning protective equipment and after removal when leaving the room. The complete contents of the Assessment and Plan and Data/Lab Results as documented above have been reviewed and addressed by myself with the patient today as part of an ongoing evaluation / treatment plan.  If some of the documentation has been copied from a previous note and is unchanged it indicates that this problem / plan has been assessed today but is stable from a previous visit and no changes have been recommended.

## 2023-10-24 NOTE — TELEPHONE ENCOUNTER
I scheduled Anthony in for evaluation. We havent ever sent anything in for him regarding this and he is having persistant pain. States this is ongoing for years however increasing with weather change

## 2023-10-25 ENCOUNTER — OFFICE VISIT (OUTPATIENT)
Dept: FAMILY MEDICINE CLINIC | Facility: CLINIC | Age: 78
End: 2023-10-25
Payer: MEDICARE

## 2023-10-25 ENCOUNTER — HOSPITAL ENCOUNTER (OUTPATIENT)
Dept: GENERAL RADIOLOGY | Facility: HOSPITAL | Age: 78
Discharge: HOME | End: 2023-10-25
Admitting: FAMILY MEDICINE
Payer: MEDICARE

## 2023-10-25 VITALS
HEIGHT: 70 IN | WEIGHT: 213.8 LBS | BODY MASS INDEX: 30.61 KG/M2 | DIASTOLIC BLOOD PRESSURE: 70 MMHG | RESPIRATION RATE: 18 BRPM | OXYGEN SATURATION: 96 % | HEART RATE: 68 BPM | SYSTOLIC BLOOD PRESSURE: 124 MMHG | TEMPERATURE: 98 F

## 2023-10-25 DIAGNOSIS — Z23 NEED FOR IMMUNIZATION AGAINST INFLUENZA: ICD-10-CM

## 2023-10-25 DIAGNOSIS — M51.9 LUMBAR DISC DISEASE: ICD-10-CM

## 2023-10-25 DIAGNOSIS — Z87.891 HISTORY OF TOBACCO USE: ICD-10-CM

## 2023-10-25 DIAGNOSIS — R05.1 ACUTE COUGH: ICD-10-CM

## 2023-10-25 DIAGNOSIS — E66.09 CLASS 1 OBESITY DUE TO EXCESS CALORIES WITH SERIOUS COMORBIDITY AND BODY MASS INDEX (BMI) OF 30.0 TO 30.9 IN ADULT: ICD-10-CM

## 2023-10-25 DIAGNOSIS — J20.8 ACUTE BACTERIAL BRONCHITIS: ICD-10-CM

## 2023-10-25 DIAGNOSIS — M54.50 ACUTE RIGHT-SIDED LOW BACK PAIN WITHOUT SCIATICA: Primary | ICD-10-CM

## 2023-10-25 DIAGNOSIS — I25.10 CORONARY ARTERY DISEASE INVOLVING NATIVE CORONARY ARTERY OF NATIVE HEART WITHOUT ANGINA PECTORIS: ICD-10-CM

## 2023-10-25 DIAGNOSIS — I10 BENIGN ESSENTIAL HTN: ICD-10-CM

## 2023-10-25 DIAGNOSIS — B96.89 ACUTE BACTERIAL BRONCHITIS: ICD-10-CM

## 2023-10-25 PROCEDURE — G0008 ADMIN INFLUENZA VIRUS VAC: HCPCS | Performed by: FAMILY MEDICINE

## 2023-10-25 PROCEDURE — 72110 X-RAY EXAM L-2 SPINE 4/>VWS: CPT

## 2023-10-25 PROCEDURE — 3078F DIAST BP <80 MM HG: CPT | Performed by: FAMILY MEDICINE

## 2023-10-25 PROCEDURE — 99214 OFFICE O/P EST MOD 30 MIN: CPT | Performed by: FAMILY MEDICINE

## 2023-10-25 PROCEDURE — 3074F SYST BP LT 130 MM HG: CPT | Performed by: FAMILY MEDICINE

## 2023-10-25 PROCEDURE — 90662 IIV NO PRSV INCREASED AG IM: CPT | Performed by: FAMILY MEDICINE

## 2023-10-25 RX ORDER — AZITHROMYCIN 250 MG/1
TABLET, FILM COATED ORAL
Qty: 6 TABLET | Refills: 0 | Status: SHIPPED | OUTPATIENT
Start: 2023-10-25

## 2023-10-25 RX ORDER — PREDNISONE 5 MG/1
TABLET ORAL
Qty: 45 TABLET | Refills: 0 | Status: SHIPPED | OUTPATIENT
Start: 2023-10-25

## 2023-10-25 RX ORDER — CYCLOBENZAPRINE HCL 10 MG
5-10 TABLET ORAL NIGHTLY PRN
Qty: 30 TABLET | Refills: 2 | Status: SHIPPED | OUTPATIENT
Start: 2023-10-25

## 2023-10-25 RX ORDER — PREDNISONE 5 MG/1
TABLET ORAL
Qty: 45 TABLET | Refills: 0 | Status: SHIPPED | OUTPATIENT
Start: 2023-10-25 | End: 2023-10-25 | Stop reason: SDUPTHER

## 2023-10-25 RX ORDER — AZITHROMYCIN 250 MG/1
TABLET, FILM COATED ORAL
Qty: 6 TABLET | Refills: 0 | Status: SHIPPED | OUTPATIENT
Start: 2023-10-25 | End: 2023-10-25 | Stop reason: SDUPTHER

## 2023-10-25 NOTE — ASSESSMENT & PLAN NOTE
Patient's (Body mass index is 30.68 kg/m².) indicates that they are obese (BMI >30) with health conditions that include hypertension and dyslipidemias . Weight is improving with lifestyle modifications. BMI  is above average; BMI management plan is completed. We discussed portion control and increasing exercise.

## 2023-11-10 ENCOUNTER — TELEPHONE (OUTPATIENT)
Dept: CARDIOLOGY | Facility: CLINIC | Age: 78
End: 2023-11-10
Payer: MEDICARE

## 2023-11-10 DIAGNOSIS — I10 HTN (HYPERTENSION), BENIGN: ICD-10-CM

## 2023-11-10 RX ORDER — AMLODIPINE BESYLATE 5 MG/1
5 TABLET ORAL DAILY
Qty: 90 TABLET | Refills: 1 | Status: SHIPPED | OUTPATIENT
Start: 2023-11-10

## 2023-11-10 RX ORDER — IRBESARTAN 300 MG/1
300 TABLET ORAL DAILY
Qty: 90 TABLET | Refills: 1 | Status: SHIPPED | OUTPATIENT
Start: 2023-11-10

## 2023-11-10 RX ORDER — METOPROLOL SUCCINATE 50 MG/1
50 TABLET, EXTENDED RELEASE ORAL DAILY
Qty: 90 TABLET | Refills: 1 | Status: SHIPPED | OUTPATIENT
Start: 2023-11-10

## 2023-11-10 NOTE — TELEPHONE ENCOUNTER
Caller: Anthony Shelley    Relationship: Self    Best call back number: [phone]     Requested Prescriptions:   Requested Prescriptions     Pending Prescriptions Disp Refills    metoprolol succinate XL (TOPROL-XL) 50 MG 24 hr tablet 90 tablet 1     Sig: Take 1 tablet by mouth Daily.    amLODIPine (NORVASC) 5 MG tablet      irbesartan (AVAPRO) 300 MG tablet       Sig: Take 1 tablet by mouth Daily.        Pharmacy where request should be sent: [facility] - [address] - [phone]  - [phone]     Last office visit with prescribing clinician: 6/12/2023   Last telemedicine visit with prescribing clinician: Visit date not found   Next office visit with prescribing clinician: 1/4/2024     Additional details provided by patient: PT WAS SEEING DR CALLAHAN AND NOW SEES DR ELLISON, PT HAS NO REFILLS AND IS REQUESTING A NEW PRESCRIPTION FOR 90 DAY REFILLS - PT     Does the patient have less than a 3 day supply:  [] Yes  [x] No    Liliana Conrad Rep   11/10/23 15:04 EST

## 2024-01-03 NOTE — PROGRESS NOTES
Date of Office Visit: 2024  Encounter Provider: Dr. Alon Mccabe  Place of Service: [facility] CARDIOLOGY [place]  Patient Name: Anthony Shelely  :1945  Jonathan Johnson MD    Chief Complaint   Patient presents with    Coronary Artery Disease    Hyperlipidemia    Follow-up     History of Present Illness    I am pleased to see Mr. Shelley in my office today as a follow-up    As you know, patient is 78-year-old white gentleman whose past medical history is significant for hypertension, hyperlipidemia, CAD who came today for follow-up    In 2018, patient had symptoms of chest discomfort and underwent stress echocardiography which showed baseline EF of 45 to 50%.  Postexercise images showed septal and inferior wall hypokinesis consistent with LAD ischemia.  Patient underwent cardiac catheterization by Dr. Soliz.  Patient was noted to have 100% occlusion of LAD and 50% diagonal branch of LAD.  Distal LAD was reconstituted with collaterals.  LCx and RCA was free of significant disease.  Patient was recommended to have medical treatment.  Patient did well.  In 2020, patient underwent stress test at [facility] which showed large apical myocardial infarction without any significant ischemia.  In 2022, patient underwent repeat echocardiogram which showed EF of 45%.  In 2022, patient underwent cardiac catheterization by Dr. Soliz again at [facility].  LVEF was noted to be 55%.  RCA was free of significant disease and it was giving collaterals to LAD.  Left main was free of significant disease.  LAD was 100% occluded in the ostium.  Ramus intermedius was hemodynamically free of significant disease.  LCx was free of significant disease.  D1 branch of LAD after his region had 70 to 80% stenosis.  Patient was recommended to have possible laser intervention of D1 branch of LAD.    Patient came today and overall doing well.  He is fairly active.  He does all  chores at home.  He walks.  Patient denies any symptom of angina pectoris or chest pain or tightness or heaviness.  No significant shortness of breath.  No orthopnea PND no syncope or presyncope.  No palpitation or leg edema.    EKG showed sinus bradycardia with poor progression of R wave anteriorly.    Patient has significant coronary artery disease.  His EKG is abnormal but clinically patient is stable.  Patient has high-grade stenosis of D1 branch of LAD and was recommended to have possible laser guided intervention of D1 branch of LAD.  However patient is totally asymptomatic after discussion with the patient I recommended medical treatment.  However I recommend to repeat echocardiogram in 6 months.  His blood pressure is controlled on current regimen.  He brought the blood pressure log book.  His recent LDL is 58 which is desirable.      Past Medical History:   Diagnosis Date    Abnormal EKG     Impression: q waves anterior leads, asymptomatic eval / rx for htn in progress Follow up recheck stress echo sched    Abnormal stress test     Impression: resting ef 45% with hypomobility septum / distal segment not improved with exercise, and apical lv dysfuxn with exercise has seen Dr Soliz, cardiolyte stress test upcoming Call / return if chest pain on exertion, respiratory difficulty, worsening symptoms.    Abnormal TSH     Abscess of leg, right     Impression: improving, packing left out continue antibiotics    CAD (coronary artery disease)     Impression: 100% occ with collaterals, 50% in another artery followed by fernanda    Carotid bruit     Cellulitis of right lower extremity     mpression: much improved s/p I and D / evacuation hematoma persistent open wound / wound care referrall Topical care discussed. continue antibiotics    Chest pain     Colon cancer screening     Impression: Remote history of colonoscopy per his report, repeat colonoscopy scheduled    Elevated blood pressure reading     Impression:  mild elevation Discussed low sodium diet, lifestyle modification. + fh monitor bp at home Follow up recheck consider rx if if persistent elevation.    Elevated LFTs     Impression: mild followed by [facility] holding statin    Fractured skull     Hip pain     History of chickenpox     History of tobacco use     History of use of hearing aid in both ears     HTN (hypertension), benign     Impression: good control    Hyperlipidemia     Lumbar disc disease     Impression: strain ice 20 minutes bid nsaids Rehabilitation exercises discussed. h/o ldd, has had imaging in past, + chronic sciatica Consider repeat imaging if persistent symptoms.    Medicare annual wellness visit, subsequent     With abnormal findings.    Mixed hyperlipidemia     Impression: Discussed diet, exercise, lifestyle modification. mild ekevation Follow up recheck ------------- Stable Compliant with meds Is getting adequate diet and exercise Goals developed at last visit were met Care management needs are self addressed.    Pain of right lower extremity     Screening for alcoholism     Screening for depression     Screening for hyperlipidemia     Screening PSA (prostate specific antigen)     Impression: psa normal check geoff at upcoming colonoscopy    Sun-damaged skin     Syncope     Impression: clinically improved today, no further episodes, benign exam    Wrist fracture, left, closed, initial encounter          Past Surgical History:   Procedure Laterality Date    CARDIAC CATHETERIZATION  12/12/2018    CHOLECYSTECTOMY  1996    SKIN BIOPSY  01/20/2022           Current Outpatient Medications:     amLODIPine (NORVASC) 5 MG tablet, Take 1 tablet by mouth Daily., Disp: 90 tablet, Rfl: 1    aspirin 81 MG EC tablet, Take 1 tablet by mouth Daily., Disp: , Rfl:     irbesartan (AVAPRO) 300 MG tablet, Take 1 tablet by mouth Daily., Disp: 90 tablet, Rfl: 1    metoprolol succinate XL (TOPROL-XL) 50 MG 24 hr tablet, Take 1 tablet by mouth Daily., Disp: 90 tablet,  Rfl: 1    Multiple Vitamins-Minerals (CENTRUM ADULTS) tablet, Take 1 tablet by mouth Daily., Disp: , Rfl:     Omega-3 Fatty Acids (FISH OIL PO), Take  by mouth., Disp: , Rfl:     rosuvastatin (CRESTOR) 5 MG tablet, TAKE 1 TABLET EVERY DAY, Disp: 90 tablet, Rfl: 1      Social History     Socioeconomic History    Marital status:    Tobacco Use    Smoking status: Former     Packs/day: 1.00     Years: 39.00     Additional pack years: 0.00     Total pack years: 39.00     Types: Cigarettes     Start date: 1961     Quit date: 2000     Years since quittin.0    Smokeless tobacco: Never   Vaping Use    Vaping Use: Never used   Substance and Sexual Activity    Alcohol use: Yes     Alcohol/week: 15.0 standard drinks of alcohol     Types: 15 Cans of beer per week    Drug use: No    Sexual activity: Defer         Review of Systems   Constitutional: Negative for chills and fever.   HENT:  Negative for ear discharge and nosebleeds.    Eyes:  Negative for discharge and redness.   Cardiovascular:  Negative for chest pain, orthopnea, palpitations, paroxysmal nocturnal dyspnea and syncope.   Respiratory:  Negative for cough, shortness of breath and wheezing.    Endocrine: Negative for heat intolerance.   Skin:  Negative for rash.   Musculoskeletal:  Negative for arthritis and myalgias.   Gastrointestinal:  Negative for abdominal pain, melena, nausea and vomiting.   Genitourinary:  Negative for dysuria and hematuria.   Neurological:  Negative for dizziness, light-headedness, numbness and tremors.   Psychiatric/Behavioral:  Negative for depression. The patient is not nervous/anxious.        Procedures      ECG 12 Lead    Date/Time: 2024 6:18 PM  Performed by: Alon Mccabe MD    Authorized by: Alon Mccabe MD  Comparison: compared with previous ECG   Similar to previous ECG  Rhythm: sinus rhythm  Q waves: V1, V2, V3 and V4      Clinical impression: abnormal EKG          ECG 12 Lead    (Results Pending)          "  Objective:    /76 (BP Location: Right arm, Patient Position: Sitting, Cuff Size: Large Adult)   Pulse 58   Resp 18   Ht 177.8 cm (70\")   Wt 96.6 kg (213 lb)   SpO2 100%   BMI 30.56 kg/m²         Constitutional:       Appearance: Well-developed.   Eyes:      General: No scleral icterus.        Right eye: No discharge.   HENT:      Head: Normocephalic and atraumatic.   Neck:      Thyroid: No thyromegaly.      Lymphadenopathy: No cervical adenopathy.   Pulmonary:      Effort: Pulmonary effort is normal. No respiratory distress.      Breath sounds: Normal breath sounds. No wheezing. No rales.   Cardiovascular:      Normal rate. Regular rhythm.      No gallop.    Edema:     Peripheral edema absent.   Abdominal:      Tenderness: There is no abdominal tenderness.   Skin:     Findings: No erythema or rash.   Neurological:      Mental Status: Alert and oriented to person, place, and time.             Assessment:       Diagnosis Plan   1. Coronary artery disease involving native coronary artery of native heart without angina pectoris        2. Mixed hyperlipidemia                 Plan:       MDM:    1.  Hypertension:    All the blood pressure reading at home are within desirable range I will continue irbesartan and amlodipine and metoprolol    2.  CAD:    Patient has history of 100% LAD occlusion.  However patient had D1 branch of LAD which had high-grade stenosis but patient is totally asymptomatic.  I will consider cardiac catheterization if there is symptoms develop.    3.  LV dysfunction:    Patient had previous LV dysfunction.  I will repeat echocardiogram continue ARB and beta-blocker    4.  Hyperlipidemia:    Patient is on Crestor recent LDL is 58 which is desirable  "

## 2024-01-04 ENCOUNTER — OFFICE VISIT (OUTPATIENT)
Dept: CARDIOLOGY | Facility: CLINIC | Age: 79
End: 2024-01-04
Payer: MEDICARE

## 2024-01-04 VITALS
DIASTOLIC BLOOD PRESSURE: 80 MMHG | HEART RATE: 58 BPM | RESPIRATION RATE: 18 BRPM | SYSTOLIC BLOOD PRESSURE: 139 MMHG | HEIGHT: 70 IN | OXYGEN SATURATION: 100 % | WEIGHT: 213 LBS | BODY MASS INDEX: 30.49 KG/M2

## 2024-01-04 DIAGNOSIS — E78.2 MIXED HYPERLIPIDEMIA: ICD-10-CM

## 2024-01-04 DIAGNOSIS — I10 ESSENTIAL HYPERTENSION: ICD-10-CM

## 2024-01-04 DIAGNOSIS — I25.10 CORONARY ARTERY DISEASE INVOLVING NATIVE CORONARY ARTERY OF NATIVE HEART WITHOUT ANGINA PECTORIS: Primary | ICD-10-CM

## 2024-01-04 PROCEDURE — 3079F DIAST BP 80-89 MM HG: CPT | Performed by: INTERNAL MEDICINE

## 2024-01-04 PROCEDURE — 93000 ELECTROCARDIOGRAM COMPLETE: CPT | Performed by: INTERNAL MEDICINE

## 2024-01-04 PROCEDURE — 3075F SYST BP GE 130 - 139MM HG: CPT | Performed by: INTERNAL MEDICINE

## 2024-01-04 PROCEDURE — 99214 OFFICE O/P EST MOD 30 MIN: CPT | Performed by: INTERNAL MEDICINE

## 2024-01-04 PROCEDURE — 1159F MED LIST DOCD IN RCRD: CPT | Performed by: INTERNAL MEDICINE

## 2024-01-04 PROCEDURE — 1160F RVW MEDS BY RX/DR IN RCRD: CPT | Performed by: INTERNAL MEDICINE

## 2024-01-26 DIAGNOSIS — I10 HTN (HYPERTENSION), BENIGN: ICD-10-CM

## 2024-01-26 DIAGNOSIS — E78.2 MIXED HYPERLIPIDEMIA: ICD-10-CM

## 2024-01-26 RX ORDER — ROSUVASTATIN CALCIUM 5 MG/1
5 TABLET, COATED ORAL DAILY
Qty: 90 TABLET | Refills: 1 | Status: SHIPPED | OUTPATIENT
Start: 2024-01-26

## 2024-01-26 RX ORDER — IRBESARTAN 300 MG/1
300 TABLET ORAL DAILY
Qty: 90 TABLET | Refills: 1 | Status: SHIPPED | OUTPATIENT
Start: 2024-01-26

## 2024-01-26 RX ORDER — AMLODIPINE BESYLATE 5 MG/1
5 TABLET ORAL DAILY
Qty: 90 TABLET | Refills: 1 | Status: SHIPPED | OUTPATIENT
Start: 2024-01-26

## 2024-01-26 RX ORDER — METOPROLOL SUCCINATE 50 MG/1
50 TABLET, EXTENDED RELEASE ORAL DAILY
Qty: 90 TABLET | Refills: 1 | Status: SHIPPED | OUTPATIENT
Start: 2024-01-26

## 2024-01-26 NOTE — TELEPHONE ENCOUNTER
Caller: Anthony Shelley    Relationship: Self    Best call back number: [phone]    Requested Prescriptions:   Requested Prescriptions     Pending Prescriptions Disp Refills    amLODIPine (NORVASC) 5 MG tablet 90 tablet 1     Sig: Take 1 tablet by mouth Daily.    irbesartan (AVAPRO) 300 MG tablet 90 tablet 1     Sig: Take 1 tablet by mouth Daily.    metoprolol succinate XL (TOPROL-XL) 50 MG 24 hr tablet 90 tablet 1     Sig: Take 1 tablet by mouth Daily.        Pharmacy where request should be sent: [facility], [address] [phone] [facility] [phone]      Last office visit with prescribing clinician: 1/4/2024   Last telemedicine visit with prescribing clinician: Visit date not found   Next office visit with prescribing clinician: 7/3/2024     Additional details provided by patient: PT HAS SWITCHED INSURANCE OVER TO [employer], AND NOW NEEDS ALL PRESCRIPTIONS CALLED INTO THE [facility] FOR REFILLS.     Does the patient have less than a 3 day supply:  [] Yes  [x] No    Would you like a call back once the refill request has been completed: [] Yes [x] No    If the office needs to give you a call back, can they leave a voicemail: [x] Yes [] No    Liliana Perez Rep   01/26/24 14:29 EST

## 2024-01-26 NOTE — TELEPHONE ENCOUNTER
Caller: Anthony Shelley    Relationship: Self    Best call back number: [phone]    Requested Prescriptions:   Requested Prescriptions     Pending Prescriptions Disp Refills    rosuvastatin (CRESTOR) 5 MG tablet 90 tablet 1     Sig: Take 1 tablet by mouth Daily.        Pharmacy where request should be sent: [facility], [address] [phone] [facility] [phone]     Last office visit with prescribing clinician: 10/25/2023   Last telemedicine visit with prescribing clinician: Visit date not found   Next office visit with prescribing clinician: 8/23/2024     Additional details provided by patient:     Does the patient have less than a 3 day supply:  [] Yes  [x] No        Liliana Boswell Rep   01/26/24 14:30 EST

## 2024-04-23 RX ORDER — IRBESARTAN 300 MG/1
300 TABLET ORAL DAILY
Qty: 90 TABLET | Refills: 1 | Status: SHIPPED | OUTPATIENT
Start: 2024-04-23

## 2024-06-30 DIAGNOSIS — I10 HTN (HYPERTENSION), BENIGN: ICD-10-CM

## 2024-07-01 RX ORDER — AMLODIPINE BESYLATE 5 MG/1
5 TABLET ORAL DAILY
Qty: 90 TABLET | Refills: 1 | Status: SHIPPED | OUTPATIENT
Start: 2024-07-01

## 2024-07-01 RX ORDER — METOPROLOL SUCCINATE 50 MG/1
50 TABLET, EXTENDED RELEASE ORAL DAILY
Qty: 90 TABLET | Refills: 1 | Status: SHIPPED | OUTPATIENT
Start: 2024-07-01

## 2024-07-01 NOTE — PROGRESS NOTES
Date of Office Visit: 2024  Encounter Provider: Dr. Alon Mccabe  Place of Service: [facility] CARDIOLOGY [place]  Patient Name: Anthony Shelley  :1945  Jonathan Johnson MD    Chief Complaint   Patient presents with    Coronary Artery Disease     Involving native coronary artery of native heart without angina pectoris    Follow-up     History of Present Illness    I am pleased to see Mr. Shelley in my office today as a follow-up    As you know, patient is 79-year-old white gentleman whose past medical history is significant for hypertension, hyperlipidemia, CAD who came today for follow-up    In 2018, patient had symptoms of chest discomfort and underwent stress echocardiography which showed baseline EF of 45 to 50%.  Postexercise images showed septal and inferior wall hypokinesis consistent with LAD ischemia.  Patient underwent cardiac catheterization by Dr. Soliz.  Patient was noted to have 100% occlusion of LAD and 50% diagonal branch of LAD.  Distal LAD was reconstituted with collaterals.  LCx and RCA was free of significant disease.  Patient was recommended to have medical treatment.  Patient did well.  In 2020, patient underwent stress test at [facility] which showed large apical myocardial infarction without any significant ischemia.  In 2022, patient underwent repeat echocardiogram which showed EF of 45%.  In 2022, patient underwent cardiac catheterization by Dr. Soliz again at [facility].  LVEF was noted to be 55%.  RCA was free of significant disease and it was giving collaterals to LAD.  Left main was free of significant disease.  LAD was 100% occluded in the ostium.  Ramus intermedius was hemodynamically free of significant disease.  LCx was free of significant disease.  D1 branch of LAD after his region had 70 to 80% stenosis.  Patient was recommended to have possible laser intervention of D1 branch of LAD.    Patient came today  for follow-up.  Patient reports that he just returned from his vacation from [place] and he was very active and was doing multiple hikes.  Patient did not have any chest pain.  Patient did report mild shortness of breath.  No orthopnea PND no syncope or presyncope.  No leg edema noted.    EKG showed normal sinus rhythm.  Poor progression of R wave noted.  Anterior myocardial infarction noted.    I am very pleased with the patient progress.  Patient is chest pain-free.  I will consider repeating echocardiogram on next visit.  Distractor modification discussed.  I will see the patient in 1 year..      Past Medical History:   Diagnosis Date    Abnormal EKG     Impression: q waves anterior leads, asymptomatic eval / rx for htn in progress Follow up recheck stress echo sched    Abnormal stress test     Impression: resting ef 45% with hypomobility septum / distal segment not improved with exercise, and apical lv dysfuxn with exercise has seen Dr Soliz, cardiolyte stress test upcoming Call / return if chest pain on exertion, respiratory difficulty, worsening symptoms.    Abnormal TSH     Abscess of leg, right     Impression: improving, packing left out continue antibiotics    CAD (coronary artery disease)     Impression: 100% occ with collaterals, 50% in another artery followed by fernanda    Carotid bruit     Cellulitis of right lower extremity     mpression: much improved s/p I and D / evacuation hematoma persistent open wound / wound care referrall Topical care discussed. continue antibiotics    Chest pain     Colon cancer screening     Impression: Remote history of colonoscopy per his report, repeat colonoscopy scheduled    Elevated blood pressure reading     Impression: mild elevation Discussed low sodium diet, lifestyle modification. + fh monitor bp at home Follow up recheck consider rx if if persistent elevation.    Elevated LFTs     Impression: mild followed by fernanda holding statin    Fractured skull     Hip  pain     History of chickenpox     History of tobacco use     History of use of hearing aid in both ears     HTN (hypertension), benign     Impression: good control    Hyperlipidemia     Lumbar disc disease     Impression: strain ice 20 minutes bid nsaids Rehabilitation exercises discussed. h/o ldd, has had imaging in past, + chronic sciatica Consider repeat imaging if persistent symptoms.    Medicare annual wellness visit, subsequent     With abnormal findings.    Mixed hyperlipidemia     Impression: Discussed diet, exercise, lifestyle modification. mild ekevation Follow up recheck ------------- Stable Compliant with meds Is getting adequate diet and exercise Goals developed at last visit were met Care management needs are self addressed.    Pain of right lower extremity     Screening for alcoholism     Screening for depression     Screening for hyperlipidemia     Screening PSA (prostate specific antigen)     Impression: psa normal check geoff at upcoming colonoscopy    Sun-damaged skin     Syncope     Impression: clinically improved today, no further episodes, benign exam    Wrist fracture, left, closed, initial encounter          Past Surgical History:   Procedure Laterality Date    CARDIAC CATHETERIZATION  12/12/2018    CHOLECYSTECTOMY  1996    SKIN BIOPSY  01/20/2022           Current Outpatient Medications:     amLODIPine (NORVASC) 5 MG tablet, TAKE ONE TABLET BY MOUTH EVERY DAY, Disp: 90 tablet, Rfl: 1    aspirin 81 MG EC tablet, Take 1 tablet by mouth Every Other Day., Disp: , Rfl:     irbesartan (AVAPRO) 300 MG tablet, TAKE ONE TABLET BY MOUTH ONCE DAILY, Disp: 90 tablet, Rfl: 1    metoprolol succinate XL (TOPROL-XL) 50 MG 24 hr tablet, TAKE ONE TABLET BY MOUTH EVERY DAY, Disp: 90 tablet, Rfl: 1    Multiple Vitamins-Minerals (CENTRUM ADULTS) tablet, Take 1 tablet by mouth Daily., Disp: , Rfl:     Omega-3 Fatty Acids (FISH OIL PO), Take  by mouth., Disp: , Rfl:     rosuvastatin (CRESTOR) 5 MG tablet, Take 1  "tablet by mouth Daily., Disp: 90 tablet, Rfl: 1      Social History     Socioeconomic History    Marital status:    Tobacco Use    Smoking status: Former     Current packs/day: 0.00     Average packs/day: 1 pack/day for 39.0 years (39.0 ttl pk-yrs)     Types: Cigarettes     Start date: 1961     Quit date: 2000     Years since quittin.5    Smokeless tobacco: Never   Vaping Use    Vaping status: Never Used   Substance and Sexual Activity    Alcohol use: Yes     Alcohol/week: 15.0 standard drinks of alcohol     Types: 15 Cans of beer per week    Drug use: No    Sexual activity: Defer         Review of Systems   Constitutional: Negative for chills and fever.   HENT:  Negative for ear discharge and nosebleeds.    Eyes:  Negative for discharge and redness.   Cardiovascular:  Negative for chest pain, orthopnea, palpitations, paroxysmal nocturnal dyspnea and syncope.   Respiratory:  Positive for shortness of breath. Negative for cough and wheezing.    Endocrine: Negative for heat intolerance.   Skin:  Negative for rash.   Musculoskeletal:  Negative for arthritis and myalgias.   Gastrointestinal:  Negative for abdominal pain, melena, nausea and vomiting.   Genitourinary:  Negative for dysuria and hematuria.   Neurological:  Negative for dizziness, light-headedness, numbness and tremors.   Psychiatric/Behavioral:  Negative for depression. The patient is not nervous/anxious.        Procedures      ECG 12 Lead    Date/Time: 7/3/2024 3:49 PM  Performed by: Alon Mccabe MD    Authorized by: Alon Mccabe MD  Comparison: compared with previous ECG   Similar to previous ECG  Rhythm: sinus rhythm  Q waves: V1 and V1-V6    Other findings: poor R wave progression    Clinical impression: abnormal EKG          ECG 12 Lead    (Results Pending)           Objective:    /61 (BP Location: Right arm, Patient Position: Sitting, Cuff Size: Large Adult)   Resp 18   Ht 177.8 cm (70\")   Wt 97.1 kg (214 lb)   SpO2 98% "   BMI 30.71 kg/m²         Constitutional:       Appearance: Well-developed.   Eyes:      General: No scleral icterus.        Right eye: No discharge.   HENT:      Head: Normocephalic and atraumatic.   Neck:      Thyroid: No thyromegaly.      Lymphadenopathy: No cervical adenopathy.   Pulmonary:      Effort: Pulmonary effort is normal. No respiratory distress.      Breath sounds: Normal breath sounds. No wheezing. No rales.   Cardiovascular:      Normal rate. Regular rhythm.      No gallop.    Edema:     Peripheral edema absent.   Abdominal:      Tenderness: There is no abdominal tenderness.   Skin:     Findings: No erythema or rash.   Neurological:      Mental Status: Alert and oriented to person, place, and time.             Assessment:       Diagnosis Plan   1. Coronary artery disease involving native coronary artery of native heart without angina pectoris  ECG 12 Lead      2. Mixed hyperlipidemia        3. Benign essential HTN                 Plan:       MDM:    1.  CAD:    Patient is asymptomatic factor modification recommended    2.  Hypertension:    Patient is controlled change in the regimen continue Avapro and amlodipine.    Number.  Repeat lipid panel testing.

## 2024-07-03 ENCOUNTER — OFFICE VISIT (OUTPATIENT)
Dept: CARDIOLOGY | Facility: CLINIC | Age: 79
End: 2024-07-03
Payer: MEDICARE

## 2024-07-03 VITALS
HEIGHT: 70 IN | OXYGEN SATURATION: 98 % | SYSTOLIC BLOOD PRESSURE: 118 MMHG | BODY MASS INDEX: 30.64 KG/M2 | DIASTOLIC BLOOD PRESSURE: 61 MMHG | RESPIRATION RATE: 18 BRPM | WEIGHT: 214 LBS

## 2024-07-03 DIAGNOSIS — I25.10 CORONARY ARTERY DISEASE INVOLVING NATIVE CORONARY ARTERY OF NATIVE HEART WITHOUT ANGINA PECTORIS: Primary | ICD-10-CM

## 2024-07-03 DIAGNOSIS — E78.2 MIXED HYPERLIPIDEMIA: ICD-10-CM

## 2024-07-03 DIAGNOSIS — I10 BENIGN ESSENTIAL HTN: ICD-10-CM

## 2024-07-03 PROCEDURE — 3074F SYST BP LT 130 MM HG: CPT | Performed by: INTERNAL MEDICINE

## 2024-07-03 PROCEDURE — 93000 ELECTROCARDIOGRAM COMPLETE: CPT | Performed by: INTERNAL MEDICINE

## 2024-07-03 PROCEDURE — 3078F DIAST BP <80 MM HG: CPT | Performed by: INTERNAL MEDICINE

## 2024-07-03 PROCEDURE — 1159F MED LIST DOCD IN RCRD: CPT | Performed by: INTERNAL MEDICINE

## 2024-07-03 PROCEDURE — 99214 OFFICE O/P EST MOD 30 MIN: CPT | Performed by: INTERNAL MEDICINE

## 2024-07-03 PROCEDURE — 1160F RVW MEDS BY RX/DR IN RCRD: CPT | Performed by: INTERNAL MEDICINE

## 2024-08-19 NOTE — PROGRESS NOTES
Subjective   Anthony Shelley is a 79 y.o. male.     Chief Complaint   Patient presents with    Medicare Wellness-subsequent    Hypertension    Hyperlipidemia    Coronary Artery Disease       The patient is here: to discuss health maintenance and disease prevention to follow up on multiple medical problems.  Last comprehensive physical was on 8/18/2023.  Previous physical was performed by  Jonathan Johnson MD  Overall has: moderate activity with work/home activities, exercises 2 - 3 times per week, good appetite, feels well with minor complaints, good energy level, and is sleeping well. Nutrition: eating a variety of foods. Last tetanus shot was  7/10/2017 .       Previous Exams:  Last PSA was 1.5 on 4/16/2018.    Last US Carotid Dopplers was on 9/22/2022 ordered by Dr Soliz.    · Mild plaquing in the carotid bulbs bilaterally but no hemodynamically  significant stenosis.    Last Colonoscopy was on 6/28/2018 by Dr Johnson.    · Sclerosed internal hemorrhoids   · Normal colonic mucosa to the cecum    Last Stress Test was on 3/12/2020 ordered by Dr Soliz.    · Findings consistent with a normal ECG stress test.   · Left ventricular ejection fraction is normal (Calculated EF = 56%).   · Impressions are consistent with an intermediate risk study.   · Severe large myocardial infarction in the apex with some dale-infarct ischemia Calculated EF 56% probably over estimation Clinical correlation is recommended.    Last EKG was on 7/3/2024 ordered by Dr Mccabe.    · Normal sinus rhythm.     · Poor progression of R wave noted.     · Anterior myocardial infarction noted.    Last Echocardiogram was on 9/22/2022 ordered by Dr Soliz.    · Left ventricular wall thickness is consistent with mild concentric hypertrophy.   · Estimated left ventricular EF = 45% Left ventricular systolic function is low normal.   · There is calcification of the aortic valve mainly affecting the right coronary cusp(s).   · The right ventricular cavity  is mild to moderately dilated.   · Left ventricular diastolic function is consistent with (grade I) impaired relaxation.   · Estimated right ventricular systolic pressure from tricuspid regurgitation is mildly elevated (35-45 mmHg).   · Mild pulmonary hypertension is present.         History of Present Illness     Recent Hospitalizations:  No hospitalization(s) within the last year..  [facility]    Patient Care Team:  Jonathan Johnson MD as PCP - General  Jonathan Johnson MD as PCP - Family Medicine  Alon Mccabe MD as Consulting Physician (Cardiology)     I personally reviewed and updated the patient's allergies, medications, problem list, and past medical, surgical, social, and family history. I have reviewed and confirmed the accuracy of the HPI and ROS as documented by the MA/LPN/RN Kathya Valenzuela MA      Family History   Problem Relation Age of Onset    No Known Problems Mother     Heart disease Father     Stroke Father     No Known Problems Sister     Cancer Brother         Lymph node       Social History     Tobacco Use    Smoking status: Former     Current packs/day: 0.00     Average packs/day: 1 pack/day for 39.0 years (39.0 ttl pk-yrs)     Types: Cigarettes     Start date: 1961     Quit date: 2000     Years since quittin.6    Smokeless tobacco: Never   Vaping Use    Vaping status: Never Used   Substance Use Topics    Alcohol use: Yes     Alcohol/week: 15.0 standard drinks of alcohol     Types: 15 Cans of beer per week    Drug use: No       Past Surgical History:   Procedure Laterality Date    CARDIAC CATHETERIZATION  2018    CHOLECYSTECTOMY  1996    SKIN BIOPSY  2022       Patient Active Problem List   Diagnosis    Coronary artery disease involving native coronary artery of native heart without angina pectoris    Mixed hyperlipidemia    Lumbar disc disease    Class 1 obesity due to excess calories with serious comorbidity and body mass index (BMI) of 30.0 to 30.9 in adult    Colon cancer  "screening    Medicare annual wellness visit, subsequent    Screening for prostate cancer    Callus of foot    Elevated LFTs    Cellulitis of right lower extremity    History of tobacco use    Benign essential HTN    Acute bacterial bronchitis         Current Outpatient Medications:     amLODIPine (NORVASC) 5 MG tablet, TAKE ONE TABLET BY MOUTH EVERY DAY, Disp: 90 tablet, Rfl: 1    aspirin 81 MG EC tablet, Take 1 tablet by mouth Every Other Day., Disp: , Rfl:     irbesartan (AVAPRO) 300 MG tablet, TAKE ONE TABLET BY MOUTH ONCE DAILY, Disp: 90 tablet, Rfl: 1    metoprolol succinate XL (TOPROL-XL) 50 MG 24 hr tablet, TAKE ONE TABLET BY MOUTH EVERY DAY, Disp: 90 tablet, Rfl: 1    Multiple Vitamins-Minerals (CENTRUM ADULTS) tablet, Take 1 tablet by mouth Daily., Disp: , Rfl:     Omega-3 Fatty Acids (FISH OIL PO), Take  by mouth., Disp: , Rfl:     rosuvastatin (CRESTOR) 5 MG tablet, Take 1 tablet by mouth Daily., Disp: 90 tablet, Rfl: 1    No opioid medication identified on active medication list. I have reviewed chart for other potential  high risk medication/s and harmful drug interactions in the elderly.          Objective   /78 (BP Location: Right arm, Patient Position: Sitting, Cuff Size: Adult)   Pulse 79   Temp 98.4 °F (36.9 °C) (Temporal)   Resp 16   Ht 177.8 cm (70\")   Wt 99 kg (218 lb 3.2 oz)   SpO2 95%   BMI 31.31 kg/m²   BP Readings from Last 3 Encounters:   08/23/24 128/78   07/03/24 118/61   01/04/24 139/80     Wt Readings from Last 3 Encounters:   08/23/24 99 kg (218 lb 3.2 oz)   07/03/24 97.1 kg (214 lb)   01/04/24 96.6 kg (213 lb)       No results found.     Age-appropriate Screening Schedule:  Refer to the list below for future screening recommendations based on patient's age, sex and/or medical conditions. Orders for these Shannen Gonsales tests are listed in the plan section. The patient has been provided with a written plan.    Health Maintenance   Topic Date Due    ZOSTER VACCINE (1 of 2) " Never done    RSV Vaccine - Adults (1 - 1-dose 60+ series) Never done    COVID-19 Vaccine ( season) 2023    LIPID PANEL  2024    INFLUENZA VACCINE  2024    ANNUAL WELLNESS VISIT  2025    BMI FOLLOWUP  2025    TDAP/TD VACCINES (2 - Td or Tdap) 07/10/2027    HEPATITIS C SCREENING  Completed    Pneumococcal Vaccine 65+  Completed    COLORECTAL CANCER SCREENING  Discontinued       Fall Risk Screen:    REHAN Fall Risk Assessment was completed, and patient is at LOW risk for falls.Assessment completed on:2024    Depression Screen:       2024     9:45 AM   PHQ-2/PHQ-9 Depression Screening   Little Interest or Pleasure in Doing Things 0-->not at all   Feeling Down, Depressed or Hopeless 0-->not at all   PHQ-9: Brief Depression Severity Measure Score 0     I spent more than 16 minutes asking patient questions, counseling and documenting in the chart.    Health Habits and Functional and Cognitive Screenin/23/2024     9:00 AM   Functional & Cognitive Status   Do you have difficulty preparing food and eating? No   Do you have difficulty bathing yourself, getting dressed or grooming yourself? No   Do you have difficulty using the toilet? No   Do you have difficulty moving around from place to place? No   Do you have trouble with steps or getting out of a bed or a chair? No   Current Diet Well Balanced Diet   Dental Exam Up to date   Eye Exam Up to date   Exercise (times per week) 3 times per week   Current Exercises Include Yard Work;Walking   Do you need help using the phone?  No   Are you deaf or do you have serious difficulty hearing?  No   Do you need help to go to places out of walking distance? No   Do you need help shopping? No   Do you need help preparing meals?  No   Do you need help with housework?  No   Do you need help with laundry? No   Do you need help taking your medications? No   Do you need help managing money? No   Do you ever drive or ride in a car  without wearing a seat belt? No   Have you felt unusual stress, anger or loneliness in the last month? No   Who do you live with? Spouse   If you need help, do you have trouble finding someone available to you? No   Have you been bothered in the last four weeks by sexual problems? No   Do you have difficulty concentrating, remembering or making decisions? Yes       Does the patient have evidence of cognitive impairment? No    Advance Care Planning:  ACP discussion was held with the patient during this visit. Patient does not have an advance directive, information provided.     A face-to-face visit was completed today with patient.  Counseling explanation, and discussion of advanced directives was performed.   The last advanced care visit was performed in 2023.  In a near life ending situation, from which he is not expected to recover functionally, and he is not able to speak for him, he does not want life sustaining measures. We discussed feeding tubes, ventilators and cardiac support as well as dialysis.    I spent more than 16 minutes discussing Advanced Care Planning information and documenting in the chart.    Identification of Risk Factors:  Risk factors include: Advance Directive Discussion  Chronic Pain   Colon Cancer Screening  Dementia/Memory   Fall Risk  Immunizations Discussed/Encouraged (specific immunizations; Tdap, Influenza, Prevnar 20 (Pneumococcal 20-valent conjugate), Shingrix, COVID19, and RSV (Respiratory Syncytial Virus) )  Obesity/Overweight   Prostate Cancer Screening   Urinary Incontinence  Dental Screening Recommended; [facility] DENTAL SCREENING RECOMMENDED:  Vision Screening Recommended; [facility] VISION SCREENING    Compared to one year ago, the patient feels his physical health is the same.  Compared to one year ago, the patient feels his mental health is the same.    Patient Self-Management and Personalized Health Advice  The patient has been provided with information about: diet, exercise,  weight management, fall prevention, designing advance directives, supplements, and mental health concerns and preventive services including:   Alcohol Misuse Screening and Counseling  (15 minutes counseling time, Code )  Annual Wellness Visit (AWV)  Bone Density Measurements  Cardiovascular Disease Screening Tests (may do this order every 5 years in beneficiaries without signs or symptoms of cardiovascular disease)  Colorectal Cancer Screening, Fecal Occult Blood Test  Counseling to Prevent Tobacco Use (use of smartset and @cessation@ smartphrase for documentation)  Depression Screening (15 minutes face to face, Code ).      Assessment & Plan      Medications        Problem List         LOS      Diagnoses and all orders for this visit:    1. Medicare annual wellness visit, subsequent (Primary)    2. Benign essential HTN    3. Mixed hyperlipidemia    4. Coronary artery disease involving native coronary artery of native heart without angina pectoris    5. Class 1 obesity due to excess calories with serious comorbidity and body mass index (BMI) of 30.0 to 30.9 in adult    6. History of tobacco use    7. Colon cancer screening    8. Screening for prostate cancer    9. Thyroid disorder        Medicare wellness.  Discussed health maintenance, routine immunizations, screening tests, lifestyle modification.

## 2024-08-19 NOTE — PROGRESS NOTES
Subjective   Anthony Shelley is a 79 y.o. male.     Chief Complaint   Patient presents with    Medicare Wellness-subsequent    Hypertension    Hyperlipidemia    Coronary Artery Disease       Coronary Artery Disease  Presents for follow-up visit. Pertinent negatives include no chest pressure, chest tightness, dizziness, leg swelling, muscle weakness, palpitations, shortness of breath or weight gain. Risk factors include hyperlipidemia and obesity. Compliance with diet is good. Compliance with exercise is good. Compliance with medications is good.   Hypertension  This is a chronic problem. The current episode started more than 1 year ago. The problem has been gradually improving since onset. The problem is controlled. Associated symptoms include sweats. Pertinent negatives include no anxiety, blurred vision, headaches, malaise/fatigue, neck pain, orthopnea, palpitations, peripheral edema, PND or shortness of breath. (He does currently take his blood pressure once daily, and has been running higher than normal. Averaging 140/70s) There are no associated agents to hypertension. Risk factors for coronary artery disease include dyslipidemia, male gender and obesity. Current antihypertension treatment includes angiotensin blockers, calcium channel blockers and beta blockers. The current treatment provides mild improvement. Hypertensive end-organ damage includes CAD/MI.   Hyperlipidemia  This is a chronic problem. The current episode started more than 1 year ago. The problem is controlled. Recent lipid tests were reviewed and are normal. Exacerbating diseases include obesity. There are no known factors aggravating his hyperlipidemia. Pertinent negatives include no leg pain or shortness of breath. Current antihyperlipidemic treatment includes diet change. There are no compliance problems.  Risk factors for coronary artery disease include hypertension, male sex and obesity.            I personally reviewed and updated the  patient's allergies, medications, problem list, and past medical, surgical, social, and family history. I have reviewed and confirmed the accuracy of the History of Present Illness and Review of Symptoms as documented by the MA/LPN/RN. Jonathan Johnson MD    Family History   Problem Relation Age of Onset    No Known Problems Mother     Heart disease Father     Stroke Father     No Known Problems Sister     Cancer Brother         Lymph node       Social History     Tobacco Use    Smoking status: Former     Current packs/day: 0.00     Average packs/day: 1 pack/day for 39.0 years (39.0 ttl pk-yrs)     Types: Cigarettes     Start date: 1961     Quit date: 2000     Years since quittin.6    Smokeless tobacco: Never   Vaping Use    Vaping status: Never Used   Substance Use Topics    Alcohol use: Yes     Alcohol/week: 15.0 standard drinks of alcohol     Types: 15 Cans of beer per week    Drug use: No       Past Surgical History:   Procedure Laterality Date    CARDIAC CATHETERIZATION  2018    CHOLECYSTECTOMY  1996    SKIN BIOPSY  2022       Patient Active Problem List   Diagnosis    Coronary artery disease involving native coronary artery of native heart without angina pectoris    Mixed hyperlipidemia    Lumbar disc disease    Class 1 obesity due to excess calories with serious comorbidity and body mass index (BMI) of 30.0 to 30.9 in adult    Colon cancer screening    Medicare annual wellness visit, subsequent    Screening for prostate cancer    Callus of foot    Elevated LFTs    Cellulitis of right lower extremity    History of tobacco use    Benign essential HTN    Acute bacterial bronchitis         Current Outpatient Medications:     amLODIPine (NORVASC) 5 MG tablet, TAKE ONE TABLET BY MOUTH EVERY DAY, Disp: 90 tablet, Rfl: 1    aspirin 81 MG EC tablet, Take 1 tablet by mouth Every Other Day., Disp: , Rfl:     irbesartan (AVAPRO) 300 MG tablet, TAKE ONE TABLET BY MOUTH ONCE DAILY, Disp: 90 tablet,  "Rfl: 1    metoprolol succinate XL (TOPROL-XL) 50 MG 24 hr tablet, TAKE ONE TABLET BY MOUTH EVERY DAY, Disp: 90 tablet, Rfl: 1    Multiple Vitamins-Minerals (CENTRUM ADULTS) tablet, Take 1 tablet by mouth Daily., Disp: , Rfl:     Omega-3 Fatty Acids (FISH OIL PO), Take  by mouth., Disp: , Rfl:     rosuvastatin (CRESTOR) 5 MG tablet, Take 1 tablet by mouth Daily., Disp: 90 tablet, Rfl: 3         Review of Systems   Constitutional:  Negative for chills, diaphoresis, malaise/fatigue and unexpected weight gain.   HENT:  Negative for trouble swallowing and voice change.    Eyes:  Negative for blurred vision and visual disturbance.   Respiratory:  Negative for chest tightness and shortness of breath.    Cardiovascular:  Negative for palpitations, orthopnea, leg swelling and PND.   Gastrointestinal:  Negative for abdominal pain and nausea.   Endocrine: Negative for polydipsia and polyphagia.   Genitourinary:  Negative for hematuria.   Musculoskeletal:  Negative for muscle weakness, neck pain and neck stiffness.   Skin:  Negative for color change and pallor.   Allergic/Immunologic: Negative for immunocompromised state.   Neurological:  Negative for dizziness, seizures and syncope.   Hematological:  Negative for adenopathy.   Psychiatric/Behavioral:  Negative for sleep disturbance and suicidal ideas.        I have reviewed and confirmed the accuracy of the ROS as documented by the MA/LPN/RN Jonathan Johnson MD      Objective   /78 (BP Location: Right arm, Patient Position: Sitting, Cuff Size: Adult)   Pulse 79   Temp 98.4 °F (36.9 °C) (Temporal)   Resp 16   Ht 177.8 cm (70\")   Wt 99 kg (218 lb 3.2 oz)   SpO2 95%   BMI 31.31 kg/m²   BP Readings from Last 3 Encounters:   08/23/24 128/78   07/03/24 118/61   01/04/24 139/80     Wt Readings from Last 3 Encounters:   08/23/24 99 kg (218 lb 3.2 oz)   07/03/24 97.1 kg (214 lb)   01/04/24 96.6 kg (213 lb)     Physical Exam    Data / Lab Results:    No results found for: " "\"HGBA1C\"     Lab Results   Component Value Date    LDL 58 08/11/2023    LDL 75 07/19/2022    LDL 81 07/20/2021     Lab Results   Component Value Date    CHOL 232 (H) 04/16/2018     Lab Results   Component Value Date    TRIG 86 08/11/2023    TRIG 105 07/19/2022    TRIG 95 07/20/2021     Lab Results   Component Value Date    HDL 61 08/11/2023    HDL 57 07/19/2022    HDL 54 07/20/2021     Lab Results   Component Value Date    PSA 1.5 04/16/2018     Lab Results   Component Value Date    WBC 6.2 08/11/2023    HGB 15.8 08/11/2023    HCT 45.2 08/11/2023    MCV 93 08/11/2023     08/11/2023     Lab Results   Component Value Date    TSH 4.510 (H) 08/11/2023      Lab Results   Component Value Date    GLUCOSE 94 08/11/2023    BUN 10 08/11/2023    CREATININE 1.00 08/11/2023    EGFRIFNONA 77 07/20/2021    EGFRIFAFRI 90 07/20/2021    BCR 10 08/11/2023    K 5.5 (H) 08/11/2023    CO2 24 08/11/2023    CALCIUM 9.6 08/11/2023    PROTENTOTREF 6.5 08/11/2023    ALBUMIN 4.4 08/11/2023    LABIL2 2.1 08/11/2023    AST 31 08/11/2023    ALT 40 08/11/2023     No results found for: \"BELKIS\", \"RF\", \"SEDRATE\"   No results found for: \"CRP\"   Lab Results   Component Value Date    TIBC 305 07/24/2020    FERRITIN 551 (H) 07/24/2020      No results found for: \"CUOUTXZB04\"       Assessment & Plan      Medications        Problem List         LOS  Medicare wellness.  Doing well, vaccines updated.  Coated baby aspirin, on every other day Rx per cardiology..  Discussed health maintenance, screening test, lifestyle modification.  Followed by dermatology for yearly skin exams.  Hypertension.    Improved today.  Discussed low-sodium diet.  Follow-up recheck  Hyperlipidemia.    Much improved today back on rosuvastatin 5 mg daily.  Discussed diet, exercise, lifestyle modification.  Aggressive LDL target considering CAD.  Positive mild elevation LFTs, benefit statin outweighs risk.  Fatty liver.  Mild elevation LFTs.  Continue fish oil.  Benefit statin " outweighs risk. Drinking 6 beers per day/Discontinue/moderate.  Follow-up recheck.  Alcohol abuse.  Encourage discontinuation/cessation.  Coronary artery disease.  Clinically stable today, followed by cardiology Dr. Mccabe.  EF 45% per echo 2022.  Has had repeat heart cath 11/22 with complete occlusion LAD at the ostium, 80% distal occlusion LAD, considering referral to consider laser arthrectomy, considering stent placement, followed by Dr. Pickett in the past, cardiology for scheduled.  History of complete obstruction LAD with collaterals.  Continue risk factor reduction....  Neck pain.  Likely secondary to OA.  Ice, rehabilitation exercises discussed.  DDX includes cervical disc disease, consider imaging if persistent symptoms.  Carotid stenosis.  Mild per ultrasound 2018, 2021. continue risk factor reduction.  Screening for colon cancer.  Colonoscopy benign 2018.  Alcohol abuse.  Improved, has cut back to 2 beers per day currently.  Encourage cessation.  Colon screening.  Colonoscopy benign 2018.  COVID-19 viral infection.  Clinically improved/symptoms resolved.  Has completed quarantine.  External otitis.  Cerumen impaction cleared with irrigation today.  Start drops.  Call or return if worsening symptoms.  Cough.  Improved/resolved currently. Usually in the morning, DDx includes allergic rhinitis versus GERD.  Overall benign exam today.  Start antihistamine.  Chest benign x-ray today.  Minimal GERD symptoms currently, consider trial PPI if persistent symptoms..  Lumbar disc disease.  Flare currently.  Overall doing well, infrequent episodes.  Check x-ray, start prednisone/muscle relaxant.  Rehabilitation exercise discussed.  Consider MRI/anesthesiology referral if persistent symptoms.    Previous Exams:  Last PSA was 1.5 on 4/16/2018.    Last US Carotid Dopplers was on 9/22/2022 ordered by Dr Soliz.    · Mild plaquing in the carotid bulbs bilaterally but no hemodynamically  significant stenosis.    Last  Colonoscopy was on 6/28/2018 by Dr Johnson.    · Sclerosed internal hemorrhoids   · Normal colonic mucosa to the cecum    Last Stress Test was on 3/12/2020 ordered by Dr Soliz.    · Findings consistent with a normal ECG stress test.   · Left ventricular ejection fraction is normal (Calculated EF = 56%).   · Impressions are consistent with an intermediate risk study.   · Severe large myocardial infarction in the apex with some dale-infarct ischemia Calculated EF 56% probably over estimation Clinical correlation is recommended.    Last EKG was on 7/3/2024 ordered by Dr Mccabe.    · Normal sinus rhythm.     · Poor progression of R wave noted.     · Anterior myocardial infarction noted.    Last Echocardiogram was on 9/22/2022 ordered by Dr Soliz.    · Left ventricular wall thickness is consistent with mild concentric hypertrophy.   · Estimated left ventricular EF = 45% Left ventricular systolic function is low normal.   · There is calcification of the aortic valve mainly affecting the right coronary cusp(s).   · The right ventricular cavity is mild to moderately dilated.   · Left ventricular diastolic function is consistent with (grade I) impaired relaxation.   · Estimated right ventricular systolic pressure from tricuspid regurgitation is mildly elevated (35-45 mmHg).   · Mild pulmonary hypertension is present.        Diagnoses and all orders for this visit:    1. Medicare annual wellness visit, subsequent (Primary)  -     rosuvastatin (CRESTOR) 5 MG tablet; Take 1 tablet by mouth Daily.  Dispense: 90 tablet; Refill: 3  -     CBC & Differential  -     Comprehensive Metabolic Panel  -     T4, Free  -     TSH  -     Lipid Panel With / Chol / HDL Ratio  -     POCT urinalysis dipstick, manual    2. Benign essential HTN  -     CBC & Differential  -     Comprehensive Metabolic Panel  -     TSH  -     Lipid Panel With / Chol / HDL Ratio    3. Mixed hyperlipidemia  -     rosuvastatin (CRESTOR) 5 MG tablet; Take 1 tablet by  mouth Daily.  Dispense: 90 tablet; Refill: 3  -     Comprehensive Metabolic Panel  -     TSH  -     Lipid Panel With / Chol / HDL Ratio    4. Coronary artery disease involving native coronary artery of native heart without angina pectoris  -     CBC & Differential  -     Comprehensive Metabolic Panel  -     T4, Free  -     TSH  -     Lipid Panel With / Chol / HDL Ratio    5. Class 1 obesity due to excess calories with serious comorbidity and body mass index (BMI) of 30.0 to 30.9 in adult    6. History of tobacco use    7. Colon cancer screening    8. Screening for prostate cancer    9. Thyroid disorder  -     T4, Free  -     TSH        BMI is >= 30 and <35. (Class 1 Obesity). The following options were offered after discussion;: exercise counseling/recommendations and nutrition counseling/recommendations        Expected course, medications, and adverse effects discussed.  Call or return if worsening or persistent symptoms.  I wore protective equipment throughout this patient encounter including a mask, gloves, and eye protection.  Hand hygiene was performed before donning protective equipment and after removal when leaving the room. The complete contents of the Assessment and Plan and Data/Lab Results as documented above have been reviewed and addressed by myself with the patient today as part of an ongoing evaluation / treatment plan.  If some of the documentation has been copied from a previous note and is unchanged it indicates that this problem / plan has been assessed today but is stable from a previous visit and no changes have been recommended.

## 2024-08-23 ENCOUNTER — OFFICE VISIT (OUTPATIENT)
Dept: FAMILY MEDICINE CLINIC | Facility: CLINIC | Age: 79
End: 2024-08-23
Payer: MEDICARE

## 2024-08-23 VITALS
TEMPERATURE: 98.4 F | RESPIRATION RATE: 16 BRPM | DIASTOLIC BLOOD PRESSURE: 78 MMHG | OXYGEN SATURATION: 95 % | WEIGHT: 218.2 LBS | HEART RATE: 79 BPM | HEIGHT: 70 IN | BODY MASS INDEX: 31.24 KG/M2 | SYSTOLIC BLOOD PRESSURE: 128 MMHG

## 2024-08-23 DIAGNOSIS — E07.9 THYROID DISORDER: ICD-10-CM

## 2024-08-23 DIAGNOSIS — I25.10 CORONARY ARTERY DISEASE INVOLVING NATIVE CORONARY ARTERY OF NATIVE HEART WITHOUT ANGINA PECTORIS: ICD-10-CM

## 2024-08-23 DIAGNOSIS — E78.2 MIXED HYPERLIPIDEMIA: ICD-10-CM

## 2024-08-23 DIAGNOSIS — Z00.00 MEDICARE ANNUAL WELLNESS VISIT, SUBSEQUENT: Primary | ICD-10-CM

## 2024-08-23 DIAGNOSIS — Z87.891 HISTORY OF TOBACCO USE: ICD-10-CM

## 2024-08-23 DIAGNOSIS — I10 BENIGN ESSENTIAL HTN: ICD-10-CM

## 2024-08-23 DIAGNOSIS — Z12.5 SCREENING FOR PROSTATE CANCER: ICD-10-CM

## 2024-08-23 DIAGNOSIS — Z12.11 COLON CANCER SCREENING: ICD-10-CM

## 2024-08-23 DIAGNOSIS — E66.09 CLASS 1 OBESITY DUE TO EXCESS CALORIES WITH SERIOUS COMORBIDITY AND BODY MASS INDEX (BMI) OF 30.0 TO 30.9 IN ADULT: ICD-10-CM

## 2024-08-23 RX ORDER — ROSUVASTATIN CALCIUM 5 MG/1
5 TABLET, COATED ORAL DAILY
Qty: 90 TABLET | Refills: 3 | Status: SHIPPED | OUTPATIENT
Start: 2024-08-23

## 2024-08-23 RX ORDER — ROSUVASTATIN CALCIUM 5 MG/1
5 TABLET, COATED ORAL DAILY
Qty: 90 TABLET | Refills: 1 | OUTPATIENT
Start: 2024-08-23

## 2024-08-27 LAB
ALBUMIN SERPL-MCNC: 4.2 G/DL (ref 3.8–4.8)
ALP SERPL-CCNC: 112 IU/L (ref 44–121)
ALT SERPL-CCNC: 34 IU/L (ref 0–44)
AST SERPL-CCNC: 33 IU/L (ref 0–40)
BASOPHILS # BLD AUTO: 0.1 X10E3/UL (ref 0–0.2)
BASOPHILS NFR BLD AUTO: 2 %
BILIRUB SERPL-MCNC: 0.7 MG/DL (ref 0–1.2)
BUN SERPL-MCNC: 10 MG/DL (ref 8–27)
BUN/CREAT SERPL: 11 (ref 10–24)
CALCIUM SERPL-MCNC: 8.9 MG/DL (ref 8.6–10.2)
CHLORIDE SERPL-SCNC: 94 MMOL/L (ref 96–106)
CHOLEST SERPL-MCNC: 121 MG/DL (ref 100–199)
CHOLEST/HDLC SERPL: 2.4 RATIO (ref 0–5)
CO2 SERPL-SCNC: 22 MMOL/L (ref 20–29)
CREAT SERPL-MCNC: 0.93 MG/DL (ref 0.76–1.27)
EGFRCR SERPLBLD CKD-EPI 2021: 84 ML/MIN/1.73
EOSINOPHIL # BLD AUTO: 0.2 X10E3/UL (ref 0–0.4)
EOSINOPHIL NFR BLD AUTO: 4 %
ERYTHROCYTE [DISTWIDTH] IN BLOOD BY AUTOMATED COUNT: 11.6 % (ref 11.6–15.4)
GLOBULIN SER CALC-MCNC: 2.1 G/DL (ref 1.5–4.5)
GLUCOSE SERPL-MCNC: 93 MG/DL (ref 70–99)
HCT VFR BLD AUTO: 46 % (ref 37.5–51)
HDLC SERPL-MCNC: 51 MG/DL
HGB BLD-MCNC: 15.3 G/DL (ref 13–17.7)
IMM GRANULOCYTES # BLD AUTO: 0 X10E3/UL (ref 0–0.1)
IMM GRANULOCYTES NFR BLD AUTO: 0 %
LDLC SERPL CALC-MCNC: 49 MG/DL (ref 0–99)
LYMPHOCYTES # BLD AUTO: 1.4 X10E3/UL (ref 0.7–3.1)
LYMPHOCYTES NFR BLD AUTO: 23 %
MCH RBC QN AUTO: 32.1 PG (ref 26.6–33)
MCHC RBC AUTO-ENTMCNC: 33.3 G/DL (ref 31.5–35.7)
MCV RBC AUTO: 96 FL (ref 79–97)
MONOCYTES # BLD AUTO: 0.6 X10E3/UL (ref 0.1–0.9)
MONOCYTES NFR BLD AUTO: 10 %
NEUTROPHILS # BLD AUTO: 3.7 X10E3/UL (ref 1.4–7)
NEUTROPHILS NFR BLD AUTO: 61 %
PLATELET # BLD AUTO: 315 X10E3/UL (ref 150–450)
POTASSIUM SERPL-SCNC: 5 MMOL/L (ref 3.5–5.2)
PROT SERPL-MCNC: 6.3 G/DL (ref 6–8.5)
RBC # BLD AUTO: 4.77 X10E6/UL (ref 4.14–5.8)
SODIUM SERPL-SCNC: 128 MMOL/L (ref 134–144)
T4 FREE SERPL-MCNC: 1.43 NG/DL (ref 0.82–1.77)
TRIGL SERPL-MCNC: 116 MG/DL (ref 0–149)
TSH SERPL DL<=0.005 MIU/L-ACNC: 2.63 UIU/ML (ref 0.45–4.5)
VLDLC SERPL CALC-MCNC: 21 MG/DL (ref 5–40)
WBC # BLD AUTO: 6 X10E3/UL (ref 3.4–10.8)

## 2024-11-07 RX ORDER — IRBESARTAN 300 MG/1
300 TABLET ORAL DAILY
Qty: 90 TABLET | Refills: 1 | Status: SHIPPED | OUTPATIENT
Start: 2024-11-07

## 2024-11-07 NOTE — TELEPHONE ENCOUNTER
Caller: Anthony Shelley JACKIE    Relationship: Self    Best call back number: [phone]    Requested Prescriptions:   Requested Prescriptions     Pending Prescriptions Disp Refills    irbesartan (AVAPRO) 300 MG tablet 90 tablet 1     Sig: Take 1 tablet by mouth Daily.        [facility] [address] [phone] [facility] [phone] [facility]     Last office visit with prescribing clinician: 7/3/2024   Last telemedicine visit with prescribing clinician: Visit date not found   Next office visit with prescribing clinician: 7/2/2025         Does the patient have less than a 3 day supply:  [] Yes  [x] No    Would you like a call back once the refill request has been completed: [x] Yes [] No    If the office needs to give you a call back, can they leave a voicemail: [x] Yes [] No    Liliana Hicks Rep   11/07/24 13:26 EST

## 2024-12-17 DIAGNOSIS — I10 HTN (HYPERTENSION), BENIGN: ICD-10-CM

## 2024-12-17 RX ORDER — AMLODIPINE BESYLATE 5 MG/1
5 TABLET ORAL DAILY
Qty: 90 TABLET | Refills: 1 | Status: SHIPPED | OUTPATIENT
Start: 2024-12-17

## 2024-12-17 RX ORDER — METOPROLOL SUCCINATE 50 MG/1
50 TABLET, EXTENDED RELEASE ORAL DAILY
Qty: 90 TABLET | Refills: 1 | Status: SHIPPED | OUTPATIENT
Start: 2024-12-17

## 2025-05-02 NOTE — PROGRESS NOTES
Subjective   Anthony Shelley is a 77 y.o. male.     Chief Complaint   Patient presents with   • Medicare Wellness-subsequent   • Coronary Artery Disease   • Hyperlipidemia   • Hypertension       The patient is here: for coordination of medical care to discuss health maintenance and disease prevention to follow up on multiple medical problems.  Last comprehensive physical was on 7/20/2021.  Previous physical was performed by  Jonathan Johnson MD  Overall has: moderate activity with work/home activities. Nutrition: balanced diet, eating a variety of foods and supplemental vitamins. Last tetanus shot was 7/10/2017.   Patient's last carotid doppler was: 7/26/2021 Patient's last stress test was: 2/20/2020 Patient's last PSA was: 1.5 on 4/16/2018 Last colonoscopy was completed by Dr Johnson on 6/28/2018.    Coronary Artery Disease  Presents for follow-up visit. Pertinent negatives include no chest pain, chest pressure, chest tightness, dizziness, leg swelling, muscle weakness, palpitations, shortness of breath or weight gain. Risk factors include hyperlipidemia and obesity. Compliance with diet is good. Compliance with exercise is good. Compliance with medications is good.   Hypertension  This is a chronic problem. The current episode started more than 1 year ago. The problem has been gradually improving since onset. The problem is controlled. Associated symptoms include sweats. Pertinent negatives include no anxiety, blurred vision, chest pain, headaches, malaise/fatigue, neck pain, orthopnea, palpitations, peripheral edema, PND or shortness of breath. There are no associated agents to hypertension. Risk factors for coronary artery disease include dyslipidemia, male gender and obesity. Current antihypertension treatment includes angiotensin blockers, calcium channel blockers and beta blockers. The current treatment provides mild improvement. Hypertensive end-organ damage includes CAD/MI.   Hyperlipidemia  This is a  [FreeTextEntry1] : I discussed with Graeme the advantages disadvantages and limitations of office management.  We discussed the same for surgical management.  Given the involvement of both internal and external hemorrhoidal columns and associated anemia 2/2 blood loss,  I am recommending surgical care which will be definitive.  We are moving forward with surgical scheduling. chronic problem. The current episode started more than 1 year ago. The problem is controlled. Recent lipid tests were reviewed and are normal. Exacerbating diseases include obesity. There are no known factors aggravating his hyperlipidemia. Pertinent negatives include no chest pain, leg pain or shortness of breath. Current antihyperlipidemic treatment includes diet change. There are no compliance problems.  Risk factors for coronary artery disease include hypertension, male sex and obesity.        Recent Hospitalizations:  No hospitalization(s) within the last year..  ccc    I personally reviewed and updated the patient's allergies, medications, problem list, and past medical, surgical, social, and family history. I have reviewed and confirmed the accuracy of the HPI and ROS as documented by the MA/LPN/RN Jonathan Johnson MD      Family History   Problem Relation Age of Onset   • Stroke Father    • Cancer Brother         Lymph node   • No Known Problems Mother    • No Known Problems Sister        Social History     Tobacco Use   • Smoking status: Former Smoker     Packs/day: 1.00     Years: 32.00     Pack years: 32.00     Start date:      Quit date: 2000     Years since quittin.6   • Smokeless tobacco: Never Used   Vaping Use   • Vaping Use: Never used   Substance Use Topics   • Alcohol use: Yes     Comment: occasional   • Drug use: No       Past Surgical History:   Procedure Laterality Date   • CARDIAC CATHETERIZATION  2018   • CHOLECYSTECTOMY     • SKIN BIOPSY  2022       Patient Active Problem List   Diagnosis   • Coronary artery disease involving native heart   • HTN (hypertension), benign   • Mixed hyperlipidemia   • Lumbar disc disease   • Class 1 obesity due to excess calories with serious comorbidity and body mass index (BMI) of 32.0 to 32.9 in adult   • Colon cancer screening   • Medicare annual wellness visit, subsequent   • Screening for prostate cancer   • Callus of foot   •  "Elevated LFTs   • Cellulitis of right lower extremity   • History of tobacco use         Current Outpatient Medications:   •  amLODIPine (NORVASC) 5 MG tablet, Norvasc 5 mg tablet  Take 1 tablet every day by oral route at bedtime for 90 days., Disp: , Rfl:   •  aspirin 81 MG EC tablet, Take 1 tablet by mouth Daily., Disp: , Rfl:   •  irbesartan (AVAPRO) 300 MG tablet, Take 300 mg by mouth Daily., Disp: , Rfl:   •  metoprolol succinate XL (TOPROL-XL) 50 MG 24 hr tablet, TAKE 1 TABLET EVERY DAY, Disp: 90 tablet, Rfl: 1  •  Multiple Vitamins-Minerals (CENTRUM ADULTS) tablet, Take 1 tablet by mouth Daily., Disp: , Rfl:   •  rosuvastatin (CRESTOR) 5 MG tablet, TAKE 1 TABLET EVERY DAY, Disp: 90 tablet, Rfl: 1         Review of Systems   Constitutional: Negative for chills, diaphoresis, malaise/fatigue and unexpected weight gain.   HENT: Negative for trouble swallowing and voice change.    Eyes: Negative for blurred vision and visual disturbance.   Respiratory: Negative for chest tightness and shortness of breath.    Cardiovascular: Negative for chest pain, palpitations, orthopnea, leg swelling and PND.   Gastrointestinal: Negative for abdominal pain and nausea.   Endocrine: Negative for polydipsia and polyphagia.   Genitourinary: Negative for hematuria.   Musculoskeletal: Negative for muscle weakness, neck pain and neck stiffness.   Skin: Negative for color change and pallor.   Allergic/Immunologic: Negative for immunocompromised state.   Neurological: Negative for dizziness, seizures and syncope.   Hematological: Negative for adenopathy.   Psychiatric/Behavioral: Negative for hallucinations, sleep disturbance and suicidal ideas.       I have reviewed and confirmed the accuracy of the ROS as documented by the MA/LPN/RN Jonathan Johnson MD      Objective   /64 (BP Location: Right arm, Patient Position: Sitting, Cuff Size: Large Adult)   Pulse 74   Temp 98.1 °F (36.7 °C) (Skin)   Resp 18   Ht 177.8 cm (70\")   Wt " 103 kg (226 lb 6.4 oz)   SpO2 99%   BMI 32.49 kg/m²   BP Readings from Last 3 Encounters:   08/05/22 136/64   01/21/22 130/70   07/20/21 120/68     Wt Readings from Last 3 Encounters:   08/05/22 103 kg (226 lb 6.4 oz)   01/21/22 104 kg (230 lb)   07/20/21 101 kg (222 lb)     Physical Exam  Constitutional:       Appearance: He is well-developed. He is not diaphoretic.   HENT:      Head: Normocephalic.      Right Ear: Tympanic membrane, ear canal and external ear normal.      Left Ear: Tympanic membrane, ear canal and external ear normal.      Nose: Nose normal.   Eyes:      General: Lids are normal.      Conjunctiva/sclera: Conjunctivae normal.      Pupils: Pupils are equal, round, and reactive to light.   Neck:      Thyroid: No thyromegaly.      Vascular: No carotid bruit or JVD.      Trachea: No tracheal deviation.   Cardiovascular:      Rate and Rhythm: Normal rate and regular rhythm.      Heart sounds: Normal heart sounds. No murmur heard.    No friction rub. No gallop.   Pulmonary:      Effort: Pulmonary effort is normal.      Breath sounds: Normal breath sounds. No stridor. No decreased breath sounds, wheezing or rales.   Abdominal:      General: Bowel sounds are normal. There is no distension.      Palpations: Abdomen is soft. There is no mass.      Tenderness: There is no abdominal tenderness. There is no guarding or rebound.      Hernia: No hernia is present.   Lymphadenopathy:      Head:      Right side of head: No submental, submandibular, tonsillar, preauricular, posterior auricular or occipital adenopathy.      Left side of head: No submental, submandibular, tonsillar, preauricular, posterior auricular or occipital adenopathy.      Cervical: No cervical adenopathy.   Skin:     General: Skin is warm and dry.      Coloration: Skin is not pale.   Neurological:      Mental Status: He is alert and oriented to person, place, and time.      Cranial Nerves: No cranial nerve deficit.      Sensory: No sensory  deficit.      Coordination: Coordination normal.      Gait: Gait normal.      Deep Tendon Reflexes: Reflexes are normal and symmetric.         Data / Lab Results:    No results found for: HGBA1C     Lab Results   Component Value Date    LDL 75 07/19/2022    LDL 81 07/20/2021    LDL 67 07/07/2020     Lab Results   Component Value Date    CHOL 232 (H) 04/16/2018     Lab Results   Component Value Date    TRIG 105 07/19/2022    TRIG 95 07/20/2021    TRIG 104 07/07/2020     Lab Results   Component Value Date    HDL 57 07/19/2022    HDL 54 07/20/2021    HDL 67 07/07/2020     Lab Results   Component Value Date    PSA 1.5 04/16/2018     Lab Results   Component Value Date    WBC 7.4 07/19/2022    HGB 15.8 07/19/2022    HCT 45.7 07/19/2022    MCV 93 07/19/2022     07/19/2022     Lab Results   Component Value Date    TSH 4.610 (H) 07/19/2022      Lab Results   Component Value Date    GLUCOSE 114 (H) 07/19/2022    BUN 10 07/19/2022    CREATININE 1.08 07/19/2022    EGFRIFNONA 77 07/20/2021    EGFRIFAFRI 90 07/20/2021    BCR 9 (L) 07/19/2022    K 4.8 07/19/2022    CO2 24 07/19/2022    CALCIUM 9.6 07/19/2022    PROTENTOTREF 6.6 07/19/2022    ALBUMIN 4.5 07/19/2022    LABIL2 2.1 07/19/2022    AST 26 07/19/2022    ALT 38 07/19/2022     No results found for: BELKIS, RF, SEDRATE   No results found for: CRP   Lab Results   Component Value Date    TIBC 305 07/24/2020    FERRITIN 551 (H) 07/24/2020      No results found for: [id number]       Age-appropriate Screening Schedule:  Refer to the list below for future screening recommendations based on patient's age, sex and/or medical conditions. Orders for these recommended tests are listed in the plan section. The patient has been provided with a written plan.    Health Maintenance   Topic Date Due   • ZOSTER VACCINE (1 of 2) 08/05/2022 (Originally 6/28/1995)   • INFLUENZA VACCINE  10/01/2022   • LIPID PANEL  07/19/2023   • TDAP/TD VACCINES (2 - Td or Tdap) 07/10/2027       Depression  Screen:   PHQ-2/PHQ-9 Depression Screening 8/5/2022   Retired PHQ-9 Total Score -   Retired Total Score -   Little Interest or Pleasure in Doing Things 0-->not at all   Feeling Down, Depressed or Hopeless 0-->not at all   Trouble Falling or Staying Asleep, or Sleeping Too Much 0-->not at all   Feeling Tired or Having Little Energy 0-->not at all   Poor Appetite or Overeating 0-->not at all   Feeling Bad about Yourself - or that You are a Failure or Have Let Yourself or Your Family Down 0-->not at all   Trouble Concentrating on Things, Such as Reading the Newspaper or Watching Television 0-->not at all   Moving or Speaking So Slowly that Other People Could Have Noticed? Or the Opposite - Being So Fidgety 0-->not at all   Thoughts that You Would be Better Off Dead or of Hurting Yourself in Some Way 0-->not at all   PHQ-9: Brief Depression Severity Measure Score 0   If You Checked Off Any Problems, How Difficult Have These Problems Made It For You to Do Your Work, Take Care of Things at Home, or Get Along with Other People? not difficult at all     I spent more than 16 minutes asking patient questions, counseling and documenting in the chart.    Health Habits and Functional and Cognitive Screening:  Functional & Cognitive Status 8/5/2022   Do you have difficulty preparing food and eating? No   Do you have difficulty bathing yourself, getting dressed or grooming yourself? No   Do you have difficulty using the toilet? No   Do you have difficulty moving around from place to place? No   Do you have trouble with steps or getting out of a bed or a chair? No   Current Diet Well Balanced Diet   Dental Exam Up to date        Dental Exam Comment [facility]   Eye Exam Up to date        Eye Exam Comment [facility]   Exercise (times per week) 7 times per week   Current Exercises Include Walking;Yard Work   Current Exercise Activities Include -   Do you need help using the phone?  No   Are you deaf or do you have  serious difficulty hearing?  Yes   Do you need help with transportation? No   Do you need help shopping? No   Do you need help preparing meals?  No   Do you need help with housework?  No   Do you need help with laundry? No   Do you need help taking your medications? No   Do you need help managing money? No   Do you ever drive or ride in a car without wearing a seat belt? No   Have you felt unusual stress, anger or loneliness in the last month? No   Who do you live with? Spouse   If you need help, do you have trouble finding someone available to you? No   Have you been bothered in the last four weeks by sexual problems? No   Do you have difficulty concentrating, remembering or making decisions? No       Does the patient have evidence of cognitive impairment? No    Advance Care Planning:  ACP discussion was held with the patient during this visit. Patient does not have an advance directive, information provided.     A face-to-face visit was completed today with patient.  Counseling explanation, and discussion of advanced directives was performed.   The last advanced care visit was performed in 2021.  In a near life ending situation, from which he is not expected to recover functionally, and he is not able to speak for him, he does not want life sustaining measures. We discussed feeding tubes, ventilators and cardiac support as well as dialysis.    I spent more than 16 minutes discussing Advanced Care Planning information and documenting in the chart.    Identification of Risk Factors:  Risk factors include: Advance Directive Discussion  Cardiovascular risk  Colon Cancer Screening  Fall Risk  Immunizations Discussed/Encouraged (specific immunizations; Prevnar 20 (Pneumococcal 20-valent conjugate), Shingrix and COVID19 )  Inactivity/Sedentary  Lung Cancer Risk  Obesity/Overweight .    Compared to one year ago, the patient feels his physical health is the same.  Compared to one year ago, the patient feels his mental  health is the same.    Patient Self-Management and Personalized Health Advice  The patient has been provided with information about: diet, exercise, weight management, prevention of cardiac or vascular disease, fall prevention and designing advance directives and preventive services including:   · Annual Wellness Visit (AWV)  · Cardiovascular Disease Screening Tests (may do this order every 5 years in beneficiaries without signs or symptoms of cardiovascular disease)  · Colorectal Cancer Screening, Colonoscopy  · Influenza Vaccine and Administration  · Pneumococcal Vaccine and Administration.      Assessment & Plan      Medications        Problem List         LOS      Medicare wellness.  Doing well, vaccines current.  Coated baby aspirin daily.  Discussed health maintenance, screening test, lifestyle modification.  Followed by dermatology for yearly skin exams.  Hypertension.    Improved today.  Discussed low-sodium diet.  Follow-up recheck  Hyperlipidemia.    Much improved today back on rosuvastatin 5 mg daily.  Discussed diet, exercise, lifestyle modification.  Aggressive LDL target considering CAD.  Positive mild elevation LFTs, benefit statin outweighs risk.  Fatty liver.  Mild elevation LFTs.  Continue fish oil.  Benefit statin outweighs risk. Drinking 6 beers per day/Discontinue/moderate.  Follow-up recheck.  Coronary artery disease.  History of complete obstruction LAD with collaterals.  Continue risk factor reduction.  Followed by cardiology Dr. Odom, has had repeat stress testing March/2020..  Cellulitis right lower extremity.  Complicating hematoma.  Improved/resolved, wound healed, status post course wound care.  Neck pain.  Likely secondary to OA.  Ice, rehabilitation exercises discussed.  DDX includes cervical disc disease, consider imaging if persistent symptoms.  Carotid stenosis.  Mild per ultrasound 2018, 2021. continue risk factor reduction.  Screening for colon cancer.  Colonoscopy benign  2018.  Alcohol abuse.  Improved, has cut back to 2 beers per day currently.  Encourage cessation.  Colon screening.  Colonoscopy benign 2018.  COVID-19 viral infection.  Clinically improved/symptoms resolved.  Has completed quarantine.  External otitis.  Cerumen impaction cleared with irrigation today.  Start drops.  Call or return if worsening symptoms.  Cough.  Usually in the morning, DDx includes allergic rhinitis versus GERD.  Overall benign exam today.  Start antihistamine.  Chest benign x-ray today.  Minimal GERD symptoms currently, consider trial PPI if persistent symptoms.      Diagnoses and all orders for this visit:    1. Cough (Primary)  -     XR Chest PA & Lateral; Future    2. Medicare annual wellness visit, subsequent    3. Coronary artery disease involving native heart with angina pectoris, unspecified vessel or lesion type (HCC)    4. Mixed hyperlipidemia    5. HTN (hypertension), benign    6. Class 1 obesity due to excess calories with serious comorbidity and body mass index (BMI) of 32.0 to 32.9 in adult  Assessment & Plan:  Patient's (Body mass index is 32.49 kg/m².) indicates that they are obese (BMI >30) with health conditions that include hypertension, coronary heart disease and dyslipidemias . Weight is unchanged. BMI is is above average; BMI management plan is completed. We discussed portion control and increasing exercise.       7. History of tobacco use    8. Colon cancer screening    9. Screening for prostate cancer            Expected course, medications, and adverse effects discussed.  Call or return if worsening or persistent symptoms.  I wore protective equipment throughout this patient encounter including a mask, gloves, and eye protection.  Hand hygiene was performed before donning protective equipment and after removal when leaving the room. The complete contents of the Assessment and Plan and Data / Lab Results as documented above have been reviewed and addressed by myself with the  patient today as part of an ongoing evaluation / treatment plan.  If some of the documentation has been copied from a previous note and is unchanged it indicates that this problem / plan has been assessed today but is stable from a previous visit and no changes have been recommended.

## 2025-05-26 DIAGNOSIS — I10 HTN (HYPERTENSION), BENIGN: ICD-10-CM

## 2025-05-27 RX ORDER — METOPROLOL SUCCINATE 50 MG/1
50 TABLET, EXTENDED RELEASE ORAL DAILY
Qty: 90 TABLET | Refills: 1 | Status: SHIPPED | OUTPATIENT
Start: 2025-05-27

## 2025-05-27 RX ORDER — AMLODIPINE BESYLATE 5 MG/1
5 TABLET ORAL DAILY
Qty: 90 TABLET | Refills: 1 | Status: SHIPPED | OUTPATIENT
Start: 2025-05-27

## 2025-06-18 DIAGNOSIS — Z00.00 MEDICARE ANNUAL WELLNESS VISIT, SUBSEQUENT: ICD-10-CM

## 2025-06-18 DIAGNOSIS — E78.2 MIXED HYPERLIPIDEMIA: ICD-10-CM

## 2025-06-18 RX ORDER — ROSUVASTATIN CALCIUM 5 MG/1
5 TABLET, COATED ORAL DAILY
Qty: 90 TABLET | Refills: 3 | Status: SHIPPED | OUTPATIENT
Start: 2025-06-18

## 2025-06-30 NOTE — PROGRESS NOTES
Date of Office Visit: 2025  Encounter Provider: Dr. Alon Mccabe  Place of Service: [facility] CARDIOLOGY [place]  Patient Name: Anthony Shelley  :1945  Jonathan Johnson MD    Chief Complaint   Patient presents with    Coronary Artery Disease    Hypertension    Hyperlipidemia    Follow-up     History of Present Illness    I am pleased to see Mr. Shelley in my office today as a follow-up    As you know, patient is 80-year-old white gentleman whose past medical history is significant for hypertension, hyperlipidemia, CAD who came today for follow-up    In 2018, patient had symptoms of chest discomfort and underwent stress echocardiography which showed baseline EF of 45 to 50%.  Postexercise images showed septal and inferior wall hypokinesis consistent with LAD ischemia.  Patient underwent cardiac catheterization by Dr. Soliz.  Patient was noted to have 100% occlusion of LAD and 50% diagonal branch of LAD.  Distal LAD was reconstituted with collaterals.  LCx and RCA was free of significant disease.  Patient was recommended to have medical treatment.  Patient did well.  In 2020, patient underwent stress test at [facility] which showed large apical myocardial infarction without any significant ischemia.  In 2022, patient underwent repeat echocardiogram which showed EF of 45%.  In 2022, patient underwent cardiac catheterization by Dr. Soliz again at [facility].  LVEF was noted to be 55%.  RCA was free of significant disease and it was giving collaterals to LAD.  Left main was free of significant disease.  LAD was 100% occluded in the ostium.  Ramus intermedius was hemodynamically free of significant disease.  LCx was free of significant disease.  D1 branch of LAD after his region had 70 to 80% stenosis.  Patient was recommended to have possible laser intervention of D1 branch of LAD.    Patient came today for follow-up.  Patient denies any symptom of  chest pain or tightness or heaviness.  Patient is very active.  Patient is downsizing.  Patient is selling his barn.  No orthopnea PND no syncope or presyncope.  No leg edema noted.    EKG showed normal sinus rhythm.  Poor progression of R wave noted anteriorly.    At this stage, I am very pleased with the patient progress.  Patient is clinically doing well.  I would repeat echocardiogram.  Continue current treatment.      Past Medical History:   Diagnosis Date    Abnormal EKG     Impression: q waves anterior leads, asymptomatic eval / rx for htn in progress Follow up recheck stress echo sched    Abnormal stress test     Impression: resting ef 45% with hypomobility septum / distal segment not improved with exercise, and apical lv dysfuxn with exercise has seen Dr Soliz, cardiolyte stress test upcoming Call / return if chest pain on exertion, respiratory difficulty, worsening symptoms.    Abnormal TSH     Abscess of leg, right     Impression: improving, packing left out continue antibiotics    CAD (coronary artery disease)     Impression: 100% occ with collaterals, 50% in another artery followed by ferannda    Carotid bruit     Cellulitis of right lower extremity     mpression: much improved s/p I and D / evacuation hematoma persistent open wound / wound care referrall Topical care discussed. continue antibiotics    Chest pain     Colon cancer screening     Impression: Remote history of colonoscopy per his report, repeat colonoscopy scheduled    Elevated blood pressure reading     Impression: mild elevation Discussed low sodium diet, lifestyle modification. + fh monitor bp at home Follow up recheck consider rx if if persistent elevation.    Elevated LFTs     Impression: mild followed by fernanda holding statin    Fractured skull     Hip pain     History of chickenpox     History of tobacco use     History of use of hearing aid in both ears     HTN (hypertension), benign     Impression: good control    Hyperlipidemia      Lumbar disc disease     Impression: strain ice 20 minutes bid nsaids Rehabilitation exercises discussed. h/o ldd, has had imaging in past, + chronic sciatica Consider repeat imaging if persistent symptoms.    Medicare annual wellness visit, subsequent     With abnormal findings.    Mixed hyperlipidemia     Impression: Discussed diet, exercise, lifestyle modification. mild ekevation Follow up recheck ------------- Stable Compliant with meds Is getting adequate diet and exercise Goals developed at last visit were met Care management needs are self addressed.    Pain of right lower extremity     Screening for alcoholism     Screening for depression     Screening for hyperlipidemia     Screening PSA (prostate specific antigen)     Impression: psa normal check geoff at upcoming colonoscopy    Sun-damaged skin     Syncope     Impression: clinically improved today, no further episodes, benign exam    Wrist fracture, left, closed, initial encounter          Past Surgical History:   Procedure Laterality Date    CARDIAC CATHETERIZATION  12/12/2018    CHOLECYSTECTOMY  1996    SKIN BIOPSY  01/20/2022           Current Outpatient Medications:     amLODIPine (NORVASC) 5 MG tablet, TAKE ONE TABLET BY MOUTH EVERY DAY, Disp: 90 tablet, Rfl: 1    aspirin 81 MG EC tablet, Take 1 tablet by mouth Every Other Day., Disp: , Rfl:     irbesartan (AVAPRO) 300 MG tablet, Take 1 tablet by mouth Daily., Disp: 90 tablet, Rfl: 1    metoprolol succinate XL (TOPROL-XL) 50 MG 24 hr tablet, TAKE ONE TABLET BY MOUTH EVERY DAY, Disp: 90 tablet, Rfl: 1    Multiple Vitamins-Minerals (CENTRUM ADULTS) tablet, Take 1 tablet by mouth Daily., Disp: , Rfl:     Omega-3 Fatty Acids (FISH OIL PO), Take  by mouth., Disp: , Rfl:     rosuvastatin (CRESTOR) 5 MG tablet, TAKE ONE TABLET BY MOUTH EVERY DAY, Disp: 90 tablet, Rfl: 3      Social History     Socioeconomic History    Marital status:    Tobacco Use    Smoking status: Former     Current packs/day:  "0.00     Average packs/day: 1 pack/day for 39.0 years (39.0 ttl pk-yrs)     Types: Cigarettes     Start date: 1961     Quit date: 2000     Years since quittin.5    Smokeless tobacco: Never   Vaping Use    Vaping status: Never Used   Substance and Sexual Activity    Alcohol use: Yes     Alcohol/week: 15.0 standard drinks of alcohol     Types: 15 Cans of beer per week    Drug use: No    Sexual activity: Defer         Review of Systems   Constitutional: Negative for chills and fever.   HENT:  Negative for ear discharge and nosebleeds.    Eyes:  Negative for discharge and redness.   Cardiovascular:  Negative for chest pain, orthopnea, palpitations, paroxysmal nocturnal dyspnea and syncope.   Respiratory:  Negative for cough, shortness of breath and wheezing.    Endocrine: Negative for heat intolerance.   Skin:  Negative for rash.   Musculoskeletal:  Negative for arthritis and myalgias.   Gastrointestinal:  Negative for abdominal pain, melena, nausea and vomiting.   Genitourinary:  Negative for dysuria and hematuria.   Neurological:  Negative for dizziness, light-headedness, numbness and tremors.   Psychiatric/Behavioral:  Negative for depression. The patient is not nervous/anxious.        Procedures      ECG 12 Lead    Date/Time: 2025 11:19 AM  Performed by: Alon Mccabe MD    Authorized by: Alon Mccabe MD  Comparison: compared with previous ECG   Similar to previous ECG  Rhythm: sinus rhythm  Other findings: poor R wave progression    Clinical impression: abnormal EKG          ECG 12 Lead    (Results Pending)           Objective:    /73 (BP Location: Right arm, Patient Position: Sitting, Cuff Size: Large Adult)   Pulse 61   Resp 16   Ht 177 cm (69.69\")   Wt 96.2 kg (212 lb)   SpO2 99%   BMI 30.69 kg/m²         Constitutional:       Appearance: Well-developed.   Eyes:      General: No scleral icterus.        Right eye: No discharge.   HENT:      Head: Normocephalic and atraumatic.   Neck: "      Thyroid: No thyromegaly.      Lymphadenopathy: No cervical adenopathy.   Pulmonary:      Effort: Pulmonary effort is normal. No respiratory distress.      Breath sounds: Normal breath sounds. No wheezing. No rales.   Cardiovascular:      Normal rate. Regular rhythm.      No gallop.    Edema:     Peripheral edema absent.   Abdominal:      Tenderness: There is no abdominal tenderness.   Skin:     Findings: No erythema or rash.   Neurological:      Mental Status: Alert and oriented to person, place, and time.             Assessment:       Diagnosis Plan   1. Coronary artery disease involving native coronary artery of native heart without angina pectoris  ECG 12 Lead    Adult Transthoracic Echo Complete W/ Cont if Necessary Per Protocol      2. Mixed hyperlipidemia  ECG 12 Lead    Adult Transthoracic Echo Complete W/ Cont if Necessary Per Protocol      3. Benign essential HTN  ECG 12 Lead    Adult Transthoracic Echo Complete W/ Cont if Necessary Per Protocol      4. Cardiomyopathy, dilated  Adult Transthoracic Echo Complete W/ Cont if Necessary Per Protocol               Plan:       MDM:    1.  CAD/CABG:    Patient is clinically doing well.  I will continue current treatment.  I am pleased with the patient progress    2.  Dilated cardiomyopathy:    I would repeat echocardiogram patient is on irbesartan and metoprolol    3.  Hypertension:    Blood pressure is controlled    4.  Mixed hyperlipidemia:    Patient is on Crestor repeat lipid panel testing

## 2025-07-02 ENCOUNTER — OFFICE VISIT (OUTPATIENT)
Dept: CARDIOLOGY | Facility: CLINIC | Age: 80
End: 2025-07-02
Payer: MEDICARE

## 2025-07-02 VITALS
HEART RATE: 61 BPM | HEIGHT: 70 IN | DIASTOLIC BLOOD PRESSURE: 73 MMHG | RESPIRATION RATE: 16 BRPM | OXYGEN SATURATION: 99 % | WEIGHT: 212 LBS | BODY MASS INDEX: 30.35 KG/M2 | SYSTOLIC BLOOD PRESSURE: 129 MMHG

## 2025-07-02 DIAGNOSIS — I42.0 CARDIOMYOPATHY, DILATED: ICD-10-CM

## 2025-07-02 DIAGNOSIS — I25.10 CORONARY ARTERY DISEASE INVOLVING NATIVE CORONARY ARTERY OF NATIVE HEART WITHOUT ANGINA PECTORIS: Primary | ICD-10-CM

## 2025-07-02 DIAGNOSIS — I10 BENIGN ESSENTIAL HTN: ICD-10-CM

## 2025-07-02 DIAGNOSIS — E78.2 MIXED HYPERLIPIDEMIA: ICD-10-CM

## 2025-07-02 PROCEDURE — 1160F RVW MEDS BY RX/DR IN RCRD: CPT | Performed by: INTERNAL MEDICINE

## 2025-07-02 PROCEDURE — 1159F MED LIST DOCD IN RCRD: CPT | Performed by: INTERNAL MEDICINE

## 2025-07-02 PROCEDURE — 3074F SYST BP LT 130 MM HG: CPT | Performed by: INTERNAL MEDICINE

## 2025-07-02 PROCEDURE — 93000 ELECTROCARDIOGRAM COMPLETE: CPT | Performed by: INTERNAL MEDICINE

## 2025-07-02 PROCEDURE — 3078F DIAST BP <80 MM HG: CPT | Performed by: INTERNAL MEDICINE

## 2025-07-02 PROCEDURE — 99214 OFFICE O/P EST MOD 30 MIN: CPT | Performed by: INTERNAL MEDICINE

## 2025-08-07 ENCOUNTER — EXTERNAL PBMM DATA (OUTPATIENT)
Dept: PHARMACY | Facility: OTHER | Age: 80
End: 2025-08-07
Payer: MEDICARE

## 2025-08-11 RX ORDER — IRBESARTAN 300 MG/1
300 TABLET ORAL DAILY
Qty: 90 TABLET | Refills: 1 | Status: SHIPPED | OUTPATIENT
Start: 2025-08-11

## 2025-08-12 ENCOUNTER — HOSPITAL ENCOUNTER (OUTPATIENT)
Dept: CARDIOLOGY | Facility: HOSPITAL | Age: 80
Discharge: HOME | End: 2025-08-12
Admitting: INTERNAL MEDICINE
Payer: MEDICARE

## 2025-08-12 VITALS — DIASTOLIC BLOOD PRESSURE: 73 MMHG | SYSTOLIC BLOOD PRESSURE: 125 MMHG

## 2025-08-12 DIAGNOSIS — I42.0 CARDIOMYOPATHY, DILATED: ICD-10-CM

## 2025-08-12 DIAGNOSIS — E78.2 MIXED HYPERLIPIDEMIA: ICD-10-CM

## 2025-08-12 DIAGNOSIS — I10 BENIGN ESSENTIAL HTN: ICD-10-CM

## 2025-08-12 DIAGNOSIS — I25.10 CORONARY ARTERY DISEASE INVOLVING NATIVE CORONARY ARTERY OF NATIVE HEART WITHOUT ANGINA PECTORIS: ICD-10-CM

## 2025-08-12 LAB
AORTIC DIMENSIONLESS INDEX: 0.91 (DI)
AV MEAN PRESS GRAD SYS DOP V1V2: 2 MMHG
AV VMAX SYS DOP: 104 CM/SEC
BH CV ECHO MEAS - ACS: 1.8 CM
BH CV ECHO MEAS - AO MAX PG: 4.3 MMHG
BH CV ECHO MEAS - AO V2 VTI: 24.9 CM
BH CV ECHO MEAS - AVA(I,D): 3.5 CM2
BH CV ECHO MEAS - EDV(CUBED): 91.1 ML
BH CV ECHO MEAS - EDV(MOD-SP4): 171 ML
BH CV ECHO MEAS - EF(MOD-SP4): 58.1 %
BH CV ECHO MEAS - ESV(CUBED): 32.8 ML
BH CV ECHO MEAS - ESV(MOD-SP4): 71.7 ML
BH CV ECHO MEAS - FS: 28.9 %
BH CV ECHO MEAS - IVS/LVPW: 1 CM
BH CV ECHO MEAS - IVSD: 0.8 CM
BH CV ECHO MEAS - LA DIMENSION: 3.6 CM
BH CV ECHO MEAS - LAT PEAK E' VEL: 8.4 CM/SEC
BH CV ECHO MEAS - LV DIASTOLIC VOL/BSA (35-75): 80.8 CM2
BH CV ECHO MEAS - LV MASS(C)D: 113.6 GRAMS
BH CV ECHO MEAS - LV MAX PG: 3.2 MMHG
BH CV ECHO MEAS - LV MEAN PG: 2 MMHG
BH CV ECHO MEAS - LV SYSTOLIC VOL/BSA (12-30): 33.9 CM2
BH CV ECHO MEAS - LV V1 MAX: 89.1 CM/SEC
BH CV ECHO MEAS - LV V1 VTI: 22.6 CM
BH CV ECHO MEAS - LVIDD: 4.5 CM
BH CV ECHO MEAS - LVIDS: 3.2 CM
BH CV ECHO MEAS - LVOT AREA: 3.8 CM2
BH CV ECHO MEAS - LVOT DIAM: 2.2 CM
BH CV ECHO MEAS - LVPWD: 0.8 CM
BH CV ECHO MEAS - MED PEAK E' VEL: 7.5 CM/SEC
BH CV ECHO MEAS - MV A MAX VEL: 84.4 CM/SEC
BH CV ECHO MEAS - MV DEC SLOPE: 277 CM/SEC2
BH CV ECHO MEAS - MV DEC TIME: 0.2 SEC
BH CV ECHO MEAS - MV E MAX VEL: 81.2 CM/SEC
BH CV ECHO MEAS - MV E/A: 0.96
BH CV ECHO MEAS - MV MAX PG: 3 MMHG
BH CV ECHO MEAS - MV MEAN PG: 2 MMHG
BH CV ECHO MEAS - MV P1/2T: 96.5 MSEC
BH CV ECHO MEAS - MV V2 VTI: 31.4 CM
BH CV ECHO MEAS - MVA(P1/2T): 2.28 CM2
BH CV ECHO MEAS - MVA(VTI): 2.7 CM2
BH CV ECHO MEAS - PA ACC TIME: 0.07 SEC
BH CV ECHO MEAS - PA V2 MAX: 130 CM/SEC
BH CV ECHO MEAS - RV MAX PG: 3.4 MMHG
BH CV ECHO MEAS - RV V1 MAX: 91.8 CM/SEC
BH CV ECHO MEAS - RV V1 VTI: 22.2 CM
BH CV ECHO MEAS - RVDD: 4 CM
BH CV ECHO MEAS - SV(LVOT): 85.9 ML
BH CV ECHO MEAS - SV(MOD-SP4): 99.3 ML
BH CV ECHO MEAS - SVI(LVOT): 40.6 ML/M2
BH CV ECHO MEAS - SVI(MOD-SP4): 46.9 ML/M2
BH CV ECHO MEAS - TAPSE (>1.6): 2.43 CM
BH CV ECHO MEASUREMENTS AVERAGE E/E' RATIO: 10.21
BH CV XLRA - TDI S': 12 CM/SEC
LV EF BIPLANE MOD: 58 %
SINUS: 3.2 CM
STJ: 2.8 CM

## 2025-08-12 PROCEDURE — 25010000002 SULFUR HEXAFLUORIDE MICROSPH 60.7-25 MG RECONSTITUTED SUSPENSION: Performed by: INTERNAL MEDICINE

## 2025-08-12 PROCEDURE — 93306 TTE W/DOPPLER COMPLETE: CPT

## 2025-08-12 RX ADMIN — SULFUR HEXAFLUORIDE 2 ML: KIT at 10:33
